# Patient Record
Sex: FEMALE | Race: ASIAN | NOT HISPANIC OR LATINO | Employment: FULL TIME | URBAN - METROPOLITAN AREA
[De-identification: names, ages, dates, MRNs, and addresses within clinical notes are randomized per-mention and may not be internally consistent; named-entity substitution may affect disease eponyms.]

---

## 2017-01-31 DIAGNOSIS — I10 ESSENTIAL HYPERTENSION, BENIGN: Primary | ICD-10-CM

## 2017-01-31 RX ORDER — LISINOPRIL 10 MG/1
30 TABLET ORAL DAILY
Qty: 270 TABLET | Refills: 0 | Status: SHIPPED | OUTPATIENT
Start: 2017-01-31 | End: 2017-04-24

## 2017-01-31 NOTE — TELEPHONE ENCOUNTER
LISINOPRIL      Last Written Prescription Date: 03-04-16  Last Fill Quantity: 270, # refills: 3    Last Office Visit with G, P or [facility] prescribing provider:  02-29-16   Future Office Visit:        BP Readings from Last 3 Encounters:   02/29/16 128/81   04/14/15 109/63   09/08/14 113/73

## 2017-02-28 DIAGNOSIS — F41.9 ANXIETY: ICD-10-CM

## 2017-02-28 RX ORDER — HYDROXYZINE HYDROCHLORIDE 25 MG/1
25 TABLET, FILM COATED ORAL EVERY 8 HOURS PRN
Qty: 60 TABLET | Refills: 3 | Status: SHIPPED | OUTPATIENT
Start: 2017-02-28 | End: 2017-10-24

## 2017-02-28 NOTE — TELEPHONE ENCOUNTER
Pt is calling and wanting this refilled today     hydrOXYzine (ATARAX) 25 MG tablet  Last Written Prescription Date: 6/30/16  Last Fill Quantity: 60, # refills: 3  Last Office Visit with G primary care provider:  2/29/16        Last PHQ-9 score on record= No flowsheet data found.      Neli Clark  Clinic [facility]

## 2017-03-24 DIAGNOSIS — E78.5 HYPERLIPIDEMIA LDL GOAL <130: ICD-10-CM

## 2017-03-24 RX ORDER — PRAVASTATIN SODIUM 10 MG
10 TABLET ORAL DAILY
Qty: 90 TABLET | Refills: 0 | Status: SHIPPED | OUTPATIENT
Start: 2017-03-24 | End: 2017-05-22

## 2017-03-24 NOTE — TELEPHONE ENCOUNTER
Pravastatin     Last Written Prescription Date: 02/29/2016  Last Fill Quantity: 90, # refills: 3  Last Office Visit with [facility] or [facility] prescribing provider: 02/29/2016       Lab Results   Component Value Date    CHOL 202 02/29/2016     Lab Results   Component Value Date    HDL 66 02/29/2016     Lab Results   Component Value Date    LDL 97 02/29/2016     Lab Results   Component Value Date    TRIG 196 02/29/2016     Lab Results   Component Value Date    CHOLHDLRATIO 3.0 06/12/2014     Coby Lao [facility]  Float Technician  [place]

## 2017-04-24 DIAGNOSIS — I10 ESSENTIAL HYPERTENSION, BENIGN: ICD-10-CM

## 2017-04-24 RX ORDER — LISINOPRIL 20 MG/1
30 TABLET ORAL DAILY
Qty: 45 TABLET | Refills: 0 | Status: SHIPPED | OUTPATIENT
Start: 2017-04-24 | End: 2017-05-22

## 2017-04-24 NOTE — TELEPHONE ENCOUNTER
Lisinopril     Last Written Prescription Date: 01/31/17  Last Fill Quantity: 270, # refills: 0  Last Office Visit with G, P or [facility] prescribing provider: 02/29/16       Potassium   Date Value Ref Range Status   02/29/2016 4.7 3.4 - 5.3 mmol/L Final     Creatinine   Date Value Ref Range Status   02/29/2016 0.76 0.52 - 1.04 mg/dL Final     BP Readings from Last 3 Encounters:   02/29/16 128/81   04/14/15 109/63   09/08/14 113/73

## 2017-05-22 ENCOUNTER — OFFICE VISIT (OUTPATIENT)
Dept: FAMILY MEDICINE | Facility: CLINIC | Age: 57
End: 2017-05-22
Payer: COMMERCIAL

## 2017-05-22 VITALS
DIASTOLIC BLOOD PRESSURE: 81 MMHG | HEART RATE: 86 BPM | HEIGHT: 58 IN | BODY MASS INDEX: 26.3 KG/M2 | SYSTOLIC BLOOD PRESSURE: 132 MMHG | TEMPERATURE: 97.9 F | WEIGHT: 125.3 LBS

## 2017-05-22 DIAGNOSIS — Z12.11 COLON CANCER SCREENING: ICD-10-CM

## 2017-05-22 DIAGNOSIS — Z12.31 ENCOUNTER FOR SCREENING MAMMOGRAM FOR BREAST CANCER: ICD-10-CM

## 2017-05-22 DIAGNOSIS — I10 ESSENTIAL HYPERTENSION, BENIGN: Primary | ICD-10-CM

## 2017-05-22 DIAGNOSIS — E78.5 HYPERLIPIDEMIA LDL GOAL <130: ICD-10-CM

## 2017-05-22 PROCEDURE — 99214 OFFICE O/P EST MOD 30 MIN: CPT | Performed by: NURSE PRACTITIONER

## 2017-05-22 RX ORDER — LISINOPRIL 20 MG/1
30 TABLET ORAL DAILY
Qty: 135 TABLET | Refills: 3 | Status: SHIPPED | OUTPATIENT
Start: 2017-05-22 | End: 2017-11-27

## 2017-05-22 RX ORDER — PRAVASTATIN SODIUM 10 MG
10 TABLET ORAL DAILY
Qty: 90 TABLET | Refills: 3 | Status: SHIPPED | OUTPATIENT
Start: 2017-05-22 | End: 2018-05-23

## 2017-05-22 NOTE — PROGRESS NOTES
"  SUBJECTIVE:                                                    Flor Seth is a 57 year old female who presents to clinic today for the following health issues:  Chief Complaint   Patient presents with     Hypertension     Pt here for a f/u on b/p and cholesterol meds.  Also due for fasting labs.       Hyperlipidemia Follow-Up      Rate your low fat/cholesterol diet?: good    Taking statin?  Yes, no muscle aches from statin    Other lipid medications/supplements?:  Fish oil/Omega 3,  without side effects     Hypertension Follow-up      Outpatient blood pressures are being checked at home.  Results are normal.    Low Salt Diet: low salt       Amount of exercise or physical activity: 1-2 days per wk, exercise video for 1 hr    Problems taking medications regularly: No    Medication side effects: none    Diet: low salt and low fat/cholesterol      Medication Followup of pravastatin and lisinopril    Taking Medication as prescribed: yes    Side Effects:  None    Medication Helping Symptoms:  yes       Problem list and histories reviewed & adjusted, as indicated.  Additional history: as documented      Reviewed and updated as needed this visit by clinical staff  Tobacco  Allergies  Med Hx  Surg Hx  Fam Hx  Soc Hx      Reviewed and updated as needed this visit by Provider         ROS:  Constitutional, HEENT, cardiovascular, pulmonary, gi and gu systems are negative, except as otherwise noted.    OBJECTIVE:                                                    /81  Pulse 86  Temp 97.9  F (36.6  C) (Tympanic)  Ht 4' 10.25\" (1.48 m)  Wt 125 lb 4.8 oz (56.8 kg)  BMI 25.96 kg/m2  Body mass index is 25.96 kg/(m^2).  GENERAL: healthy, alert and no distress  NECK: no adenopathy, no asymmetry, masses, or scars and thyroid normal to palpation  RESP: lungs clear to auscultation - no rales, rhonchi or wheezes  CV: regular rate and rhythm, normal S1 S2, no S3 or S4, no murmur, click or rub, no peripheral edema and " peripheral pulses strong  ABDOMEN: soft, nontender, no hepatosplenomegaly, no masses and bowel sounds normal  MS: no gross musculoskeletal defects noted, no edema       ASSESSMENT/PLAN:                                                        ICD-10-CM    1. Essential hypertension, benign I10 lisinopril (PRINIVIL/ZESTRIL) 20 MG tablet     Basic metabolic panel   2. Hyperlipidemia LDL goal <130 E78.5 pravastatin (PRAVACHOL) 10 MG tablet     Lipid panel reflex to direct LDL   3. Encounter for screening mammogram for breast cancer Z12.31 *MA Screening Digital Bilateral   4. Colon cancer screening Z12.11 Fecal colorectal cancer screen (FIT)       Patient Instructions   Schedule PAP with me. [phone]    Schedule mammogram: [phone]    Return FIT test.    Return for fasting labs.      The risks, benefits and treatment options of prescribed medications or other treatments have been discussed with the patient. The patient verbalized their understanding and should call or follow up if no improvement or if they develop further problems.    JUN Ngo [facility]

## 2017-05-22 NOTE — PATIENT INSTRUCTIONS
Schedule PAP with me. [phone]    Schedule mammogram: [phone]    Return FIT test.    Return for fasting labs.            Thank you for choosing [facility].  You may be receiving a survey in the mail from Moe Wilkins regarding your visit today.  Please take a few minutes to complete and return the survey to let us know how we are doing.      If you have questions or concerns, please contact us via [employer] or you can contact your care team at [phone].    Our Clinic hours are:  Monday 6:40 am  to 7:00 pm  Tuesday -Friday 6:40 am to 5:00 pm    The [place] outpatient lab hours are:  Monday - Friday 6:10 am to 4:45 pm  Saturdays 7:00 am to 11:00 am  Appointments are required, call [phone]    If you have clinical questions after hours or would like to schedule an appointment,  call the clinic at [phone].

## 2017-05-22 NOTE — NURSING NOTE
"Chief Complaint   Patient presents with     Hypertension     Pt here for a f/u on b/p and cholesterol meds.  Also due for fasting labs.       Initial /81  Pulse 86  Temp 97.9  F (36.6  C) (Tympanic)  Ht 4' 10.25\" (1.48 m)  Wt 125 lb 4.8 oz (56.8 kg)  BMI 25.96 kg/m2 Estimated body mass index is 25.96 kg/(m^2) as calculated from the following:    Height as of this encounter: 4' 10.25\" (1.48 m).    Weight as of this encounter: 125 lb 4.8 oz (56.8 kg).  Medication Reconciliation: complete  Radha Araujo CMA    "

## 2017-05-22 NOTE — MR AVS SNAPSHOT
After Visit Summary   5/22/2017    Flor Seth    MRN: [id number]           Patient Information     Date Of Birth          1960        Visit Information        Provider Department      5/22/2017 3:40 PM Xochilt Ro APRN CNP [facility]        Today's Diagnoses     Essential hypertension, benign    -  1    Hyperlipidemia LDL goal <130        Encounter for screening mammogram for breast cancer        Colon cancer screening          Care Instructions    Schedule PAP with me. [phone]    Schedule mammogram: [phone]    Return FIT test.    Return for fasting labs.            Thank you for choosing [facility].  You may be receiving a survey in the mail from [employer] regarding your visit today.  Please take a few minutes to complete and return the survey to let us know how we are doing.      If you have questions or concerns, please contact us via [employer] or you can contact your care team at [phone].    Our Clinic hours are:  Monday 6:40 am  to 7:00 pm  Tuesday -Friday 6:40 am to 5:00 pm    The [place] outpatient lab hours are:  Monday - Friday 6:10 am to 4:45 pm  Saturdays 7:00 am to 11:00 am  Appointments are required, call [phone]    If you have clinical questions after hours or would like to schedule an appointment,  call the clinic at [phone].          Follow-ups after your visit        Future tests that were ordered for you today     Open Future Orders        Priority Expected Expires Ordered    *MA Screening Digital Bilateral Routine  5/22/2018 5/22/2017    Fecal colorectal cancer screen (FIT) Routine 6/12/2017 8/14/2017 5/22/2017    Lipid panel reflex to direct LDL Routine  6/22/2017 5/22/2017    Basic metabolic panel Routine  6/22/2017 5/22/2017            Who to contact     If you have questions or need follow up information about today's clinic visit or your schedule please contact [facility] directly at  "[phone].  Normal or non-critical lab and imaging results will be communicated to you by MyChart, letter or phone within 4 business days after the clinic has received the results. If you do not hear from us within 7 days, please contact the clinic through [employer] or phone. If you have a critical or abnormal lab result, we will notify you by phone as soon as possible.  Submit refill requests through [employer] or call your pharmacy and they will forward the refill request to us. Please allow 3 business days for your refill to be completed.          Additional Information About Your Visit        [employer] Information     [employer] lets you send messages to your doctor, view your test results, renew your prescriptions, schedule appointments and more. To sign up, go to [email or web address] . Click on \"Log in\" on the left side of the screen, which will take you to the Welcome page. Then click on \"Sign up Now\" on the right side of the page.     You will be asked to enter the access code listed below, as well as some personal information. Please follow the directions to create your username and password.     Your access code is: [email or web address]  Expires: 2017  3:55 PM     Your access code will  in 90 days. If you need help or a new code, please call your [place] clinic or [phone].        Care EveryWhere ID     This is your Care EveryWhere ID. This could be used by other organizations to access your [place] medical records  [id number]        Your Vitals Were     Pulse Temperature Height BMI (Body Mass Index)          86 97.9  F (36.6  C) (Tympanic) 4' 10.25\" (1.48 m) 25.96 kg/m2         Blood Pressure from Last 3 Encounters:   17 132/81   16 128/81   04/14/15 109/63    Weight from Last 3 Encounters:   17 125 lb 4.8 oz (56.8 kg)   16 129 lb (58.5 kg)   04/14/15 118 lb 9.6 oz (53.8 kg)                 Today's Medication Changes          These changes are accurate as of: 17  3:55 " PM.  If you have any questions, ask your nurse or doctor.               These medicines have changed or have updated prescriptions.        Dose/Directions    lisinopril 20 MG tablet   Commonly known as:  PRINIVIL/ZESTRIL   This may have changed:  additional instructions   Used for:  Essential hypertension, benign   Changed by:  Xochilt Ro APRN CNP        Dose:  30 mg   Take 1.5 tablets (30 mg) by mouth daily   Quantity:  135 tablet   Refills:  3            Where to get your medicines      These medications were sent to [facility] [address]  [address] [address]     Phone:  [phone]     lisinopril 20 MG tablet    pravastatin 10 MG tablet                Primary Care Provider Office Phone # Fax #    JUN White [phone]       [facility] [address] [address]        Thank you!     Thank you for choosing [facility]  for your care. Our goal is always to provide you with excellent care. Hearing back from our patients is one way we can continue to improve our services. Please take a few minutes to complete the written survey that you may receive in the mail after your visit with us. Thank you!             Your Updated Medication List - Protect others around you: Learn how to safely use, store and throw away your medicines at www.disposemymeds.org.          This list is accurate as of: 5/22/17  3:55 PM.  Always use your most recent med list.                   Brand Name Dispense Instructions for use    aspirin 81 MG tablet      1 TABLET BY MOUTH DAILY       hydrOXYzine 25 MG tablet    ATARAX    60 tablet    Take 1 tablet (25 mg) by mouth every 8 hours as needed for anxiety       lisinopril 20 MG tablet    PRINIVIL/ZESTRIL    135 tablet    Take 1.5 tablets (30 mg) by mouth daily       Multi-vitamin Tabs tablet   Generic drug:  multivitamin, therapeutic with  minerals      Take 1 tablet by mouth daily.       OMEGA 3 PO      Once daily       pravastatin 10 MG tablet    PRAVACHOL    90 tablet    Take 1 tablet (10 mg) by mouth daily Due for labs

## 2017-08-12 ENCOUNTER — HEALTH MAINTENANCE LETTER (OUTPATIENT)
Age: 57
End: 2017-08-12

## 2017-10-24 DIAGNOSIS — F41.9 ANXIETY: ICD-10-CM

## 2017-10-25 NOTE — TELEPHONE ENCOUNTER
hydrOXYzine (ATARAX) 25 MG tablet      Last Written Prescription Date: 2/28/17  Last Fill Quantity: 60,  # refills: 3

## 2017-10-26 NOTE — TELEPHONE ENCOUNTER
Please advise on refill as this medication is not on protocol for anxiety use.    hydrOXYzine 25 mg  Last written on: 2/28/17  Last Fill: #60 with 3 refills    Last appointment in clinic:  5/22/17    Vashti ALBERTS RN

## 2017-10-26 NOTE — TELEPHONE ENCOUNTER
Patient is calling and asking why her Rx has not been filled, and can that be expedited today.  Please call and when it is sent to her pharmacy.  Janel Haile  Clinic [facility]  Flex  Patient will be out of this medication tomorrow.

## 2017-10-27 RX ORDER — HYDROXYZINE HYDROCHLORIDE 25 MG/1
TABLET, FILM COATED ORAL
Qty: 60 TABLET | Refills: 3 | Status: SHIPPED | OUTPATIENT
Start: 2017-10-27 | End: 2018-11-02

## 2017-11-27 ENCOUNTER — OFFICE VISIT (OUTPATIENT)
Dept: FAMILY MEDICINE | Facility: CLINIC | Age: 57
End: 2017-11-27
Payer: COMMERCIAL

## 2017-11-27 VITALS
SYSTOLIC BLOOD PRESSURE: 135 MMHG | HEART RATE: 67 BPM | BODY MASS INDEX: 26.41 KG/M2 | WEIGHT: 131 LBS | HEIGHT: 59 IN | DIASTOLIC BLOOD PRESSURE: 82 MMHG | TEMPERATURE: 97.2 F

## 2017-11-27 DIAGNOSIS — Z23 NEED FOR PROPHYLACTIC VACCINATION AND INOCULATION AGAINST INFLUENZA: ICD-10-CM

## 2017-11-27 DIAGNOSIS — Z12.4 CERVICAL CANCER SCREENING: ICD-10-CM

## 2017-11-27 DIAGNOSIS — I10 BENIGN ESSENTIAL HYPERTENSION: Primary | ICD-10-CM

## 2017-11-27 PROCEDURE — 87624 HPV HI-RISK TYP POOLED RSLT: CPT | Performed by: NURSE PRACTITIONER

## 2017-11-27 PROCEDURE — 90686 IIV4 VACC NO PRSV 0.5 ML IM: CPT | Performed by: NURSE PRACTITIONER

## 2017-11-27 PROCEDURE — 88175 CYTOPATH C/V AUTO FLUID REDO: CPT | Performed by: NURSE PRACTITIONER

## 2017-11-27 PROCEDURE — 99213 OFFICE O/P EST LOW 20 MIN: CPT | Mod: 25 | Performed by: NURSE PRACTITIONER

## 2017-11-27 PROCEDURE — 90471 IMMUNIZATION ADMIN: CPT | Performed by: NURSE PRACTITIONER

## 2017-11-27 RX ORDER — LISINOPRIL 40 MG/1
40 TABLET ORAL DAILY
Qty: 90 TABLET | Refills: 3 | Status: SHIPPED | OUTPATIENT
Start: 2017-11-27 | End: 2018-10-24

## 2017-11-27 NOTE — MR AVS SNAPSHOT
After Visit Summary   11/27/2017    Flor Seth    MRN: [id number]           Patient Information     Date Of Birth          1960        Visit Information        Provider Department      11/27/2017 8:00 AM Xochilt Ro APRN CNP [facility]        Today's Diagnoses     Benign essential hypertension    -  1    Cervical cancer screening        Need for prophylactic vaccination and inoculation against influenza          Care Instructions      Schedule mammogram [phone]      Thank you for choosing [facility].  You may be receiving a survey in the mail from Moe Wilkins regarding your visit today.  Please take a few minutes to complete and return the survey to let us know how we are doing.      If you have questions or concerns, please contact us via [employer] or you can contact your care team at [phone].    Our Clinic hours are:  Monday 6:40 am  to 7:00 pm  Tuesday -Friday 6:40 am to 5:00 pm    The [place] outpatient lab hours are:  Monday - Friday 6:10 am to 4:45 pm  Saturdays 7:00 am to 11:00 am  Appointments are required, call [phone]    If you have clinical questions after hours or would like to schedule an appointment,  call the clinic at [phone].            Follow-ups after your visit        Who to contact     If you have questions or need follow up information about today's clinic visit or your schedule please contact [facility] directly at [phone].  Normal or non-critical lab and imaging results will be communicated to you by MyChart, letter or phone within 4 business days after the clinic has received the results. If you do not hear from us within 7 days, please contact the clinic through [employer] or phone. If you have a critical or abnormal lab result, we will notify you by phone as soon as possible.  Submit refill requests through [employer] or call your pharmacy and they will forward the refill request to us. Please allow  "3 business days for your refill to be completed.          Additional Information About Your Visit        MyChart Information     [employer] lets you send messages to your doctor, view your test results, renew your prescriptions, schedule appointments and more. To sign up, go to [email or web address] . Click on \"Log in\" on the left side of the screen, which will take you to the Welcome page. Then click on \"Sign up Now\" on the right side of the page.     You will be asked to enter the access code listed below, as well as some personal information. Please follow the directions to create your username and password.     Your access code is: [email or web address]  Expires: 2018  8:32 AM     Your access code will  in 90 days. If you need help or a new code, please call your [place] clinic or [phone].        Care EveryWhere ID     This is your Care EveryWhere ID. This could be used by other organizations to access your [place] medical records  [id number]        Your Vitals Were     Pulse Temperature Height BMI (Body Mass Index)          67 97.2  F (36.2  C) (Oral) 4' 10.75\" (1.492 m) 26.68 kg/m2         Blood Pressure from Last 3 Encounters:   17 135/82   17 132/81   16 128/81    Weight from Last 3 Encounters:   17 131 lb (59.4 kg)   17 125 lb 4.8 oz (56.8 kg)   16 129 lb (58.5 kg)              We Performed the Following     FLU VAC, SPLIT VIRUS IM > 3 YO (QUADRIVALENT) [64612]     HPV High Risk Types DNA Cervical     Pap imaged thin layer diagnostic with HPV (select HPV order below)     Vaccine Administration, Initial [15195]          Today's Medication Changes          These changes are accurate as of: 17  8:32 AM.  If you have any questions, ask your nurse or doctor.               These medicines have changed or have updated prescriptions.        Dose/Directions    lisinopril 40 MG tablet   Commonly known as:  PRINIVIL/ZESTRIL   This may have changed:    - medication " strength  - how much to take   Used for:  Benign essential hypertension   Changed by:  Xochilt Ro APRN CNP        Dose:  40 mg   Take 1 tablet (40 mg) by mouth daily   Quantity:  90 tablet   Refills:  3            Where to get your medicines      These medications were sent [place] [facility] [place] - [address]  [address] [address]     Phone:  [phone]     lisinopril 40 MG tablet                Primary Care Provider Office Phone # Fax #    JUN White [phone]       [address] [address]        Equal Access to Services     TANNER SILVA : Hadii joni hatch hadasho Soomaali, waaxda luqadaha, qaybta kaalmada adeegyada, shakira vitale . So [facility] [phone].    ATENCIÓN: Si habla español, tiene a traivs disposición servicios gratuitos de asistencia lingüística. Llame al [phone].    We comply with applicable federal civil rights laws and [place] laws. We do not discriminate on the basis of race, color, national origin, age, disability, sex, sexual orientation, or gender identity.            Thank you!     Thank you for choosing [facility]  for your care. Our goal is always to provide you with excellent care. Hearing back from our patients is one way we can continue to improve our services. Please take a few minutes to complete the written survey that you may receive in the mail after your visit with us. Thank you!             Your Updated Medication List - Protect others around you: Learn how to safely use, store and throw away your medicines at www.disposemymeds.org.          This list is accurate as of: 11/27/17  8:32 AM.  Always use your most recent med list.                   Brand Name Dispense Instructions for use Diagnosis    aspirin 81 MG tablet      1 TABLET BY MOUTH DAILY        hydrOXYzine 25 MG tablet    ATARAX    60 tablet    TAKE ONE TABLET BY  MOUTH EVERY 8 HOURS AS NEEDED FOR ANXIETY    Anxiety       lisinopril 40 MG tablet    PRINIVIL/ZESTRIL    90 tablet    Take 1 tablet (40 mg) by mouth daily    Benign essential hypertension       Multi-vitamin Tabs tablet   Generic drug:  multivitamin, therapeutic with minerals      Take 1 tablet by mouth daily.        OMEGA 3 PO      Once daily        pravastatin 10 MG tablet    PRAVACHOL    90 tablet    Take 1 tablet (10 mg) by mouth daily Due for labs    Hyperlipidemia LDL goal <130

## 2017-11-27 NOTE — PATIENT INSTRUCTIONS
Schedule mammogram [phone]      Thank you for choosing [facility].  You may be receiving a survey in the mail from [employer] regarding your visit today.  Please take a few minutes to complete and return the survey to let us know how we are doing.      If you have questions or concerns, please contact us via [employer] or you can contact your care team at [phone].    Our Clinic hours are:  Monday 6:40 am  to 7:00 pm  Tuesday -Friday 6:40 am to 5:00 pm    The [place] outpatient lab hours are:  Monday - Friday 6:10 am to 4:45 pm  Saturdays 7:00 am to 11:00 am  Appointments are required, call [phone]    If you have clinical questions after hours or would like to schedule an appointment,  call the clinic at [phone].

## 2017-11-27 NOTE — NURSING NOTE
"Chief Complaint   Patient presents with     Hypertension     concerns about blood pressure running high.     Health Maintenance     over due for pap smear- will do today if time., mammogram and colon cancer screenings.  future orders already in place for mammogram and FIT     Flu Shot       Initial /82  Pulse 67  Temp 97.2  F (36.2  C) (Oral)  Ht 4' 10.75\" (1.492 m)  Wt 131 lb (59.4 kg)  BMI 26.68 kg/m2 Estimated body mass index is 26.68 kg/(m^2) as calculated from the following:    Height as of this encounter: 4' 10.75\" (1.492 m).    Weight as of this encounter: 131 lb (59.4 kg).  Medication Reconciliation: complete  "

## 2017-11-27 NOTE — PROGRESS NOTES

## 2017-11-27 NOTE — PROGRESS NOTES
"  SUBJECTIVE:   Flor Seth is a 57 year old female who presents to clinic today for the following health issues:      Chief Complaint   Patient presents with     Hypertension     concerns about blood pressure running high.     Health Maintenance     over due for pap smear- will do today if time., mammogram and colon cancer screenings.  future orders already in place for mammogram and FIT     Flu Shot         Hypertension Follow-up      Outpatient blood pressures are being checked at home.  states blood pressure is fine at home, but at work it goes up- she states she can feel it go up.  When she gets home from work will check bp - 150-160 systolic.  Also feels her heart rate up    Low Salt Diet: not monitoring salt- admits to adding a little bit        Amount of exercise or physical activity: walking on weekends    Problems taking medications regularly: No    Medication side effects: none    Diet: regular (no restrictions)          Problem list and histories reviewed & adjusted, as indicated.  Additional history: as documented      Reviewed and updated as needed this visit by clinical staff  Allergies  Meds       Reviewed and updated as needed this visit by Provider         ROS:  Constitutional, HEENT, cardiovascular, pulmonary, gi and gu systems are negative, except as otherwise noted.      OBJECTIVE:   /82  Pulse 67  Temp 97.2  F (36.2  C) (Oral)  Ht 4' 10.75\" (1.492 m)  Wt 131 lb (59.4 kg)  BMI 26.68 kg/m2  Body mass index is 26.68 kg/(m^2).  GENERAL: healthy, alert and no distress  RESP: lungs clear to auscultation - no rales, rhonchi or wheezes  CV: regular rate and rhythm, normal S1 S2, no S3 or S4, no murmur, click or rub, no peripheral edema and peripheral pulses strong   (female): normal female external genitalia, normal urethral meatus, vaginal mucosa, normal cervix/adnexa/uterus without masses or discharge      ASSESSMENT/PLAN:       ICD-10-CM    1. Benign essential hypertension I10 " Borderline control.  Increase lisinopril (PRINIVIL/ZESTRIL) to 40 MG tablet daily   2. Cervical cancer screening Z12.4 Pap imaged thin layer diagnostic with HPV (select HPV order below)     HPV High Risk Types DNA Cervical   3. Need for prophylactic vaccination and inoculation against influenza Z23 FLU VAC, SPLIT VIRUS IM > 3 YO (QUADRIVALENT) [83379]     Vaccine Administration, Initial [53103]       Patient Instructions     Schedule mammogram [phone]        The risks, benefits and treatment options of prescribed medications or other treatments have been discussed with the patient. The patient verbalized their understanding and should call or follow up if no improvement or if they develop further problems.    JUN Ngo [facility]

## 2017-11-27 NOTE — LETTER
December 3, 2017    Flor Seth  [address] [facility] [place] [address]    Dear Flor,  We are happy to inform you that your PAP smear result from 11/27/17 is normal.  We are now able to do a follow up test on PAP smears. The DNA test is for HPV (Human Papilloma Virus). Cervical cancer is closely linked with certain types of HPV. Your result showed no evidence of high risk HPV.  Therefore we recommend you return in 3 years for your next pap smear and HPV test.  You will still need to return to the clinic every year for an annual exam and other preventive tests.  Please contact the clinic at [phone] with any questions.  Sincerely,    JUN Ngo CNP/rlm

## 2017-11-29 LAB
COPATH REPORT: NORMAL
PAP: NORMAL

## 2017-12-01 LAB
FINAL DIAGNOSIS: NORMAL
HPV HR 12 DNA CVX QL NAA+PROBE: NEGATIVE
HPV16 DNA SPEC QL NAA+PROBE: NEGATIVE
HPV18 DNA SPEC QL NAA+PROBE: NEGATIVE
SPECIMEN DESCRIPTION: NORMAL

## 2018-05-23 DIAGNOSIS — E78.5 HYPERLIPIDEMIA LDL GOAL <130: ICD-10-CM

## 2018-05-23 NOTE — TELEPHONE ENCOUNTER
"Requested Prescriptions   Pending Prescriptions Disp Refills     pravastatin (PRAVACHOL) 10 MG tablet [Pharmacy Med Name: PRAVASTATIN SODIUM 10MG TABS]  Last Written Prescription Date:  05/22/17  Last Fill Quantity: 90,  # refills: 3   Last office visit: 11/27/2017 with prescribing provider:  11/27/17   Future Office Visit:     90 tablet 3     Sig: TAKE ONE TABLET BY MOUTH EVERY DAY    Statins Protocol Failed    5/23/2018 10:53 AM       Failed - LDL on file in past 12 months    Recent Labs   Lab Test  02/29/16   0843   LDL  97          Passed - No abnormal creatine kinase in past 12 months    No lab results found.        Passed - Recent (12 mo) or future (30 days) visit within the authorizing provider's specialty    Patient had office visit in the last 12 months or has a visit in the next 30 days with authorizing provider or within the authorizing provider's specialty.  See \"Patient Info\" tab in inbasket, or \"Choose Columns\" in Meds & Orders section of the refill encounter.           Passed - Patient is age 18 or older       Passed - No active pregnancy on record       Passed - No positive pregnancy test in past 12 months          "

## 2018-05-24 RX ORDER — PRAVASTATIN SODIUM 10 MG
10 TABLET ORAL DAILY
Qty: 30 TABLET | Refills: 0 | Status: SHIPPED | OUTPATIENT
Start: 2018-05-24 | End: 2018-06-26

## 2018-06-06 ENCOUNTER — TELEPHONE (OUTPATIENT)
Dept: FAMILY MEDICINE | Facility: CLINIC | Age: 58
End: 2018-06-06

## 2018-06-06 DIAGNOSIS — E78.5 HYPERLIPIDEMIA LDL GOAL <130: ICD-10-CM

## 2018-06-06 DIAGNOSIS — I10 ESSENTIAL HYPERTENSION, BENIGN: ICD-10-CM

## 2018-06-06 LAB
ANION GAP SERPL CALCULATED.3IONS-SCNC: 6 MMOL/L (ref 3–14)
BUN SERPL-MCNC: 19 MG/DL (ref 7–30)
CALCIUM SERPL-MCNC: 8.7 MG/DL (ref 8.5–10.1)
CHLORIDE SERPL-SCNC: 108 MMOL/L (ref 94–109)
CHOLEST SERPL-MCNC: 202 MG/DL
CO2 SERPL-SCNC: 27 MMOL/L (ref 20–32)
CREAT SERPL-MCNC: 0.7 MG/DL (ref 0.52–1.04)
GFR SERPL CREATININE-BSD FRML MDRD: 87 ML/MIN/1.7M2
GLUCOSE SERPL-MCNC: 100 MG/DL (ref 70–99)
HDLC SERPL-MCNC: 69 MG/DL
LDLC SERPL CALC-MCNC: 120 MG/DL
NONHDLC SERPL-MCNC: 133 MG/DL
POTASSIUM SERPL-SCNC: 4.2 MMOL/L (ref 3.4–5.3)
SODIUM SERPL-SCNC: 141 MMOL/L (ref 133–144)
TRIGL SERPL-MCNC: 66 MG/DL

## 2018-06-06 PROCEDURE — 80061 LIPID PANEL: CPT | Performed by: NURSE PRACTITIONER

## 2018-06-06 PROCEDURE — 36415 COLL VENOUS BLD VENIPUNCTURE: CPT | Performed by: NURSE PRACTITIONER

## 2018-06-06 PROCEDURE — 80048 BASIC METABOLIC PNL TOTAL CA: CPT | Performed by: NURSE PRACTITIONER

## 2018-06-06 NOTE — LETTER
[facility]  [address] [facility] [place] [address] [phone]    June 6, 2018    Flor Seth  [address] [facility] [facility] [address]          Dear Flor,    --Mammogram screening is generally recommended every year starting at age of 50.  Some women may choose to be screened at an earlier age ( between 40 & 50) depending on risk factors and discussions with her primary provider.   Please call [phone] to schedule a mammogram  --Colon cancer screening is generally recommended in all patients over the age of 50 years of age. This can be with either colonoscopy every 10 years, or testing the stool for blood one time per year (called a FIT test, a simple card you can send back to us in the mail). On review of our electronic medical record it appears you are due for colon cancer screening. Please call the clinic to assist in setting up a colonoscopy or, if you prefer, setting up the test for stool blood(FIT).    If you have had either of these tests at an outside facility, please let us know us so that we can update your record.     Please disregard this notice if you have already scheduled an appointment.     Sincerely,    Xochilt TANNER  [facility] - [phone]  [email or web address]

## 2018-06-14 ENCOUNTER — TELEPHONE (OUTPATIENT)
Dept: FAMILY MEDICINE | Facility: CLINIC | Age: 58
End: 2018-06-14

## 2018-06-14 NOTE — TELEPHONE ENCOUNTER
6/14/2018    Attempt 1    Contacted patient in regards to scheduling VIP mammogram  Message on voicemail     Patient is also due for - Preventive Health Screening Colonoscopy/fit test    Comments: \      Outreach   Onelia CROCKETT

## 2018-06-19 NOTE — TELEPHONE ENCOUNTER
6/19/2018    Attempt 2    Contacted patient in regards to scheduling VIP mammogram  Message on voicemail     Patient is also due for - Preventive Health Screening Colonoscopy    Comments:       Outreach   SB

## 2018-06-22 NOTE — TELEPHONE ENCOUNTER
6/22/2018    Attempt 3    Contacted patient in regards to scheduling VIP mammogram  Message on voicemail     Patient is also due for - Preventive Health Screening Colonoscopy    Comments:       Outreach   SB

## 2018-06-26 DIAGNOSIS — E78.5 HYPERLIPIDEMIA LDL GOAL <130: ICD-10-CM

## 2018-06-27 NOTE — TELEPHONE ENCOUNTER
"Requested Prescriptions   Pending Prescriptions Disp Refills     pravastatin (PRAVACHOL) 10 MG tablet [Pharmacy Med Name: PRAVASTATIN SODIUM 10MG TABS]  Last Written Prescription Date:  05/24/2018  Last Fill Quantity: 30,  # refills: 0   Last office visit: 11/27/2017 with prescribing provider:  maximiliano   Future Office Visit:     30 tablet 0     Sig: TAKE ONE TABLET BY MOUTH EVERY DAY (NEEDS FASTING LAB WORK)    Statins Protocol Passed    6/26/2018  3:51 AM       Passed - LDL on file in past 12 months    Recent Labs   Lab Test  06/06/18   0718   LDL  120*            Passed - No abnormal creatine kinase in past 12 months    No lab results found.            Passed - Recent (12 mo) or future (30 days) visit within the authorizing provider's specialty    Patient had office visit in the last 12 months or has a visit in the next 30 days with authorizing provider or within the authorizing provider's specialty.  See \"Patient Info\" tab in inbasket, or \"Choose Columns\" in Meds & Orders section of the refill encounter.           Passed - Patient is age 18 or older       Passed - No active pregnancy on record       Passed - No positive pregnancy test in past 12 months        Francisco J Austin RT (R)    "

## 2018-06-28 RX ORDER — PRAVASTATIN SODIUM 10 MG
10 TABLET ORAL DAILY
Qty: 30 TABLET | Refills: 4 | Status: SHIPPED | OUTPATIENT
Start: 2018-06-28 | End: 2018-11-02

## 2018-06-28 NOTE — TELEPHONE ENCOUNTER
Prescription approved per [facility] Refill Protocol.  Carla KAUFMAN RN

## 2018-08-19 ENCOUNTER — HEALTH MAINTENANCE LETTER (OUTPATIENT)
Age: 58
End: 2018-08-19

## 2018-10-24 ENCOUNTER — TELEPHONE (OUTPATIENT)
Dept: FAMILY MEDICINE | Facility: CLINIC | Age: 58
End: 2018-10-24

## 2018-10-24 DIAGNOSIS — I10 BENIGN ESSENTIAL HYPERTENSION: ICD-10-CM

## 2018-10-24 RX ORDER — LISINOPRIL 40 MG/1
TABLET ORAL
Qty: 30 TABLET | Refills: 0 | Status: SHIPPED | OUTPATIENT
Start: 2018-10-24 | End: 2018-11-02

## 2018-10-24 NOTE — TELEPHONE ENCOUNTER
Left message for patient to call back at [phone].    Due for OV in November.     30 day supply provided.  Gayatri LINARES RN

## 2018-10-24 NOTE — TELEPHONE ENCOUNTER
"Requested Prescriptions   Pending Prescriptions Disp Refills     lisinopril (PRINIVIL/ZESTRIL) 40 MG tablet [Pharmacy Med Name: LISINOPRIL 40MG TABS]  Last Written Prescription Date:  11/27/17  Last Fill Quantity: 90,  # refills: 3   Last office visit: 11/27/2017 with prescribing provider:  Jia   Future Office Visit:     90 tablet 3     Sig: TAKE ONE TABLET BY MOUTH EVERY DAY    ACE Inhibitors (Including Combos) Protocol Passed    10/24/2018 11:25 AM       Passed - Blood pressure under 140/90 in past 12 months    BP Readings from Last 3 Encounters:   11/27/17 135/82   05/22/17 132/81   02/29/16 128/81                Passed - Recent (12 mo) or future (30 days) visit within the authorizing provider's specialty    Patient had office visit in the last 12 months or has a visit in the next 30 days with authorizing provider or within the authorizing provider's specialty.  See \"Patient Info\" tab in inbasket, or \"Choose Columns\" in Meds & Orders section of the refill encounter.             Passed - Patient is age 18 or older       Passed - No active pregnancy on record       Passed - Normal serum creatinine on file in past 12 months    Recent Labs   Lab Test  06/06/18 0718   CR  0.70            Passed - Normal serum potassium on file in past 12 months    Recent Labs   Lab Test  06/06/18 0718   POTASSIUM  4.2            Passed - No positive pregnancy test in past 12 months          "

## 2018-11-02 ENCOUNTER — OFFICE VISIT (OUTPATIENT)
Dept: FAMILY MEDICINE | Facility: CLINIC | Age: 58
End: 2018-11-02
Payer: COMMERCIAL

## 2018-11-02 ENCOUNTER — HOSPITAL ENCOUNTER (OUTPATIENT)
Dept: MAMMOGRAPHY | Facility: CLINIC | Age: 58
Discharge: HOME | End: 2018-11-02
Attending: NURSE PRACTITIONER | Admitting: NURSE PRACTITIONER
Payer: COMMERCIAL

## 2018-11-02 VITALS
TEMPERATURE: 97.1 F | DIASTOLIC BLOOD PRESSURE: 76 MMHG | BODY MASS INDEX: 26.41 KG/M2 | HEART RATE: 52 BPM | SYSTOLIC BLOOD PRESSURE: 112 MMHG | HEIGHT: 59 IN | WEIGHT: 131 LBS | RESPIRATION RATE: 12 BRPM

## 2018-11-02 DIAGNOSIS — F41.9 ANXIETY: ICD-10-CM

## 2018-11-02 DIAGNOSIS — Z12.11 SPECIAL SCREENING FOR MALIGNANT NEOPLASMS, COLON: ICD-10-CM

## 2018-11-02 DIAGNOSIS — Z23 NEED FOR PROPHYLACTIC VACCINATION AND INOCULATION AGAINST INFLUENZA: ICD-10-CM

## 2018-11-02 DIAGNOSIS — Z12.31 ENCOUNTER FOR SCREENING MAMMOGRAM FOR BREAST CANCER: ICD-10-CM

## 2018-11-02 DIAGNOSIS — I10 BENIGN ESSENTIAL HYPERTENSION: Primary | ICD-10-CM

## 2018-11-02 DIAGNOSIS — E78.5 HYPERLIPIDEMIA LDL GOAL <130: ICD-10-CM

## 2018-11-02 PROCEDURE — 90682 RIV4 VACC RECOMBINANT DNA IM: CPT | Performed by: NURSE PRACTITIONER

## 2018-11-02 PROCEDURE — 99214 OFFICE O/P EST MOD 30 MIN: CPT | Mod: 25 | Performed by: NURSE PRACTITIONER

## 2018-11-02 PROCEDURE — 90471 IMMUNIZATION ADMIN: CPT | Performed by: NURSE PRACTITIONER

## 2018-11-02 PROCEDURE — 77067 SCR MAMMO BI INCL CAD: CPT

## 2018-11-02 RX ORDER — LISINOPRIL 40 MG/1
40 TABLET ORAL DAILY
Qty: 90 TABLET | Refills: 3 | Status: SHIPPED | OUTPATIENT
Start: 2018-11-02 | End: 2019-10-02

## 2018-11-02 RX ORDER — PRAVASTATIN SODIUM 10 MG
10 TABLET ORAL DAILY
Qty: 90 TABLET | Refills: 3 | Status: SHIPPED | OUTPATIENT
Start: 2018-11-02 | End: 2019-10-24

## 2018-11-02 RX ORDER — HYDROXYZINE HYDROCHLORIDE 25 MG/1
TABLET, FILM COATED ORAL
Qty: 60 TABLET | Refills: 3 | Status: SHIPPED | OUTPATIENT
Start: 2018-11-02 | End: 2019-07-23

## 2018-11-02 NOTE — MR AVS SNAPSHOT
After Visit Summary   11/2/2018    Flor Seth    MRN: [id number]           Patient Information     Date Of Birth          1960        Visit Information        Provider Department      11/2/2018 7:40 AM Xochilt Ro APRN CNP [facility]        Today's Diagnoses     Benign essential hypertension    -  1    Hyperlipidemia LDL goal <130        Anxiety        Need for prophylactic vaccination and inoculation against influenza        Special screening for malignant neoplasms, colon        Encounter for screening mammogram for breast cancer          Care Instructions    Return FIT test        Thank you for choosing [facility].  You may be receiving a survey in the mail from [employer] regarding your visit today.  Please take a few minutes to complete and return the survey to let us know how we are doing.      If you have questions or concerns, please contact us via IntellinX or you can contact your care team at [phone].    Our Clinic hours are:  Monday 6:40 am  to 7:00 pm  Tuesday -Friday 6:40 am to 5:00 pm    The [place] outpatient lab hours are:  Monday - Friday 6:10 am to 4:45 pm  Saturdays 7:00 am to 11:00 am  Appointments are required, call [phone]    If you have clinical questions after hours or would like to schedule an appointment,  call the clinic at [phone].            Follow-ups after your visit        Future tests that were ordered for you today     Open Future Orders        Priority Expected Expires Ordered    *MA Screening Digital Bilateral Routine  11/2/2019 11/2/2018    Fecal colorectal cancer screen FIT Routine 11/23/2018 1/25/2019 11/2/2018            Who to contact     If you have questions or need follow up information about today's clinic visit or your schedule please contact [facility] directly at [phone].  Normal or non-critical lab and imaging results will be communicated to you by PlayyOnhart, letter or phone  "within 4 business days after the clinic has received the results. If you do not hear from us within 7 days, please contact the clinic through Lokofotot or phone. If you have a critical or abnormal lab result, we will notify you by phone as soon as possible.  Submit refill requests through EpiEP or call your pharmacy and they will forward the refill request to us. Please allow 3 business days for your refill to be completed.          Additional Information About Your Visit        Care EveryWhere ID     This is your Care EveryWhere ID. This could be used by other organizations to access your [place] medical records  [id number]        Your Vitals Were     Pulse Temperature Respirations Height BMI (Body Mass Index)       52 97.1  F (36.2  C) (Tympanic) 12 4' 10.5\" (1.486 m) 26.91 kg/m2        Blood Pressure from Last 3 Encounters:   11/02/18 112/76   11/27/17 135/82   05/22/17 132/81    Weight from Last 3 Encounters:   11/02/18 131 lb (59.4 kg)   11/27/17 131 lb (59.4 kg)   05/22/17 125 lb 4.8 oz (56.8 kg)              We Performed the Following     FLU VACCINE, (RIV4) RECOMBINANT HA  , IM (FluBlok, egg free) [37351]- >18 YRS ([facility] recommended  50-64 YRS)     Vaccine Administration, Initial [19103]          Today's Medication Changes          These changes are accurate as of 11/2/18  8:10 AM.  If you have any questions, ask your nurse or doctor.               These medicines have changed or have updated prescriptions.        Dose/Directions    lisinopril 40 MG tablet   Commonly known as:  PRINIVIL/ZESTRIL   This may have changed:  See the new instructions.   Used for:  Benign essential hypertension   Changed by:  Xochilt Ro APRN CNP        Dose:  40 mg   Take 1 tablet (40 mg) by mouth daily   Quantity:  90 tablet   Refills:  3            Where to get your medicines      These medications were sent to [facility] [place] - [address]  [address] [facility] [facility] [address] "     Phone:  [phone]     hydrOXYzine 25 MG tablet    lisinopril 40 MG tablet    pravastatin 10 MG tablet                Primary Care Provider Office Phone # Fax #    Xochilt Ro, JUN SULLIVAN [phone] [address] [address]        Equal Access to Services     TANNER SILVA : Hadii aad ku hadasho Soomaali, waaxda luqadaha, qaybta kaalmada adeegyada, waxay idiin hayaan adeeg kharash la'aan . So [facility] [phone].    ATENCIÓN: Si habla español, tiene a travis disposición servicios gratuitos de asistencia lingüística. Alexis al [phone].    We comply with applicable federal civil rights laws and [place] laws. We do not discriminate on the basis of race, color, national origin, age, disability, sex, sexual orientation, or gender identity.            Thank you!     Thank you for choosing [facility]  for your care. Our goal is always to provide you with excellent care. Hearing back from our patients is one way we can continue to improve our services. Please take a few minutes to complete the written survey that you may receive in the mail after your visit with us. Thank you!             Your Updated Medication List - Protect others around you: Learn how to safely use, store and throw away your medicines at www.disposemymeds.org.          This list is accurate as of 11/2/18  8:10 AM.  Always use your most recent med list.                   Brand Name Dispense Instructions for use Diagnosis    aspirin 81 MG tablet      1 TABLET BY MOUTH DAILY        hydrOXYzine 25 MG tablet    ATARAX    60 tablet    TAKE ONE TABLET BY MOUTH EVERY 8 HOURS AS NEEDED FOR ANXIETY    Anxiety       lisinopril 40 MG tablet    PRINIVIL/ZESTRIL    90 tablet    Take 1 tablet (40 mg) by mouth daily    Benign essential hypertension       Multi-vitamin Tabs tablet   Generic drug:  multivitamin, therapeutic with minerals      Take 1 tablet by mouth daily.        OMEGA 3 PO      Once daily         pravastatin 10 MG tablet    PRAVACHOL    90 tablet    Take 1 tablet (10 mg) by mouth daily    Hyperlipidemia LDL goal <130

## 2018-11-02 NOTE — PROGRESS NOTES
"  SUBJECTIVE:   Flor Seth is a 58 year old female who presents to clinic today for the following health issues:      Hyperlipidemia Follow-Up      Rate your low fat/cholesterol diet?: fair    Taking statin?  Yes, no muscle aches from statin    Other lipid medications/supplements?:  Fish oil/Omega 3,  without side effects    Hypertension Follow-up      Outpatient blood pressures are being checked at home.- twice a day  Results are 130's/ 80's.    Low Salt Diet: t- sometimes adds salt    Anxiety Follow-Up    Status since last visit: stable    Other associated symptoms:None    Complicating factors:   Significant life event: No   Current substance abuse: None  Depression symptoms: No  No flowsheet data found.      Amount of exercise or physical activity: 2-3 days/week for an average of 30-45 minutes    Problems taking medications regularly: No    Medication side effects: none          Problem list and histories reviewed & adjusted, as indicated.  Additional history: as documented      Reviewed and updated as needed this visit by clinical staff  Tobacco  Allergies  Med Hx  Surg Hx  Fam Hx  Soc Hx      Reviewed and updated as needed this visit by Provider         ROS:  Constitutional, HEENT, cardiovascular, pulmonary, gi and gu systems are negative, except as otherwise noted.    OBJECTIVE:     /76 (BP Location: Right arm)  Pulse 52  Temp 97.1  F (36.2  C) (Tympanic)  Resp 12  Ht 4' 10.5\" (1.486 m)  Wt 131 lb (59.4 kg)  BMI 26.91 kg/m2  Body mass index is 26.91 kg/(m^2).  GENERAL: healthy, alert and no distress  NECK: no adenopathy, no asymmetry, masses, or scars and thyroid normal to palpation  RESP: lungs clear to auscultation - no rales, rhonchi or wheezes  CV: regular rate and rhythm, normal S1 S2, no S3 or S4, no murmur, click or rub, no peripheral edema and peripheral pulses strong  ABDOMEN: soft, nontender, no hepatosplenomegaly, no masses and bowel sounds normal  MS: no gross musculoskeletal " defects noted, no edema      ASSESSMENT/PLAN:       ICD-10-CM    1. Benign essential hypertension I10 Well controlled.  lisinopril (PRINIVIL/ZESTRIL) 40 MG tablet     2. Hyperlipidemia LDL goal <130 E78.5 Well controlled.  pravastatin (PRAVACHOL) 10 MG tablet     3. Anxiety F41.9 Well controlled.  hydrOXYzine (ATARAX) 25 MG tablet     4. Need for prophylactic vaccination and inoculation against influenza Z23 FLU VACCINE, (RIV4) RECOMBINANT HA  , IM (FluBlok, egg free) [55381]- >18 YRS ([facility] recommended  50-64 YRS)     Vaccine Administration, Initial [01128]   5. Special screening for malignant neoplasms, colon Z12.11 Fecal colorectal cancer screen FIT   6. Encounter for screening mammogram for breast cancer Z12.31 *MA Screening Digital Bilateral       Patient Instructions   Return FIT test        The risks, benefits and treatment options of prescribed medications or other treatments have been discussed with the patient. The patient verbalized their understanding and should call or follow up if no improvement or if they develop further problems.    JUN Ngo [facility]    Injectable Influenza Immunization Documentation    1.  Is the person to be vaccinated sick today?   No    2. Does the person to be vaccinated have an allergy to a component   of the vaccine?   No  Egg Allergy Algorithm Link    3. Has the person to be vaccinated ever had a serious reaction   to influenza vaccine in the past?   No    4. Has the person to be vaccinated ever had Guillain-Barré syndrome?   No    Form completed by TRINH LONGORIA

## 2018-11-02 NOTE — PATIENT INSTRUCTIONS
Return FIT test        Thank you for choosing [facility].  You may be receiving a survey in the mail from [employer] regarding your visit today.  Please take a few minutes to complete and return the survey to let us know how we are doing.      If you have questions or concerns, please contact us via Augure or you can contact your care team at [phone].    Our Clinic hours are:  Monday 6:40 am  to 7:00 pm  Tuesday -Friday 6:40 am to 5:00 pm    The [place] outpatient lab hours are:  Monday - Friday 6:10 am to 4:45 pm  Saturdays 7:00 am to 11:00 am  Appointments are required, call [phone]    If you have clinical questions after hours or would like to schedule an appointment,  call the clinic at [phone].

## 2019-05-15 ENCOUNTER — TELEPHONE (OUTPATIENT)
Dept: FAMILY MEDICINE | Facility: CLINIC | Age: 59
End: 2019-05-15

## 2019-05-15 NOTE — LETTER
Flor Seth  [address] [place] [address]      Dear Flor,    Xochilt Ro NP has been reviewing your chart.  It appears that there are aspects of your care that could be improved.   At this time you are due for a colonoscopy.    Colon Cancer Screening- Recommended every 5-10 years, depending on your history, in order to prevent and detect colon cancer at its earliest stages.  Colon cancer is now the second leading cause of death in the [place] for both men and women and there are over 130,000 new cases and 50,000 deaths per year from colon cancer.  Colonoscopies can prevent 90-95% of these deaths.  Problem lesions can be removed before they ever become cancer.  This test is not only looking for cancer, but also getting rid of precancerous lesions.  You are usually given some sedation which makes the test very comfortable for most people.     If you do not wish to do a colonoscopy or cannot afford to do one, at this time, there is another option.  It is called a Fit test or Fecal Immunochemical Occult Blood Test (take home stool sample kit).  It does not replace the colonoscopy for colorectal cancer screening, but it can detect hidden bleeding in the lower colon.  It does need to be repeated every year and if a positive result is obtained, you would be referred for a colonoscopy.  The FIT test is really easy to do and does not require any diet or medication restrictions and involves only one collection sample.     If you are under/uninsured, we recommend you contact the [employer] Program- They provide free/reduced fee screenings to eligible patients based on family size and income.    [place]- [place]'s cancer screening program;  Toll free 1-681.347.2707-/  [phone]  [employer]    If you have completed either one of these tests or had a flexible sigmoidoscopy in the past five years at another facility, please let us know so that we can update your records.  Please call us  ([phone]) if you have questions or would like to arrange either to do a colonoscopy or obtain the necessary test kit for the Fecal Occult Test.

## 2019-05-15 NOTE — TELEPHONE ENCOUNTER
Panel Management Review          Composite cancer screening  Chart review shows that this patient is due/due soon for the following Colonoscopy  Summary:    Patient is due/failing the following:   COLONOSCOPY    Action needed:   Patient needs referral/order: for colonoscopy vs. FIT    Type of outreach:    Sent letter.    Questions for provider review:    None                                                                                                                                    TRINH LONGORIA

## 2019-07-23 DIAGNOSIS — F41.9 ANXIETY: ICD-10-CM

## 2019-07-24 RX ORDER — HYDROXYZINE HYDROCHLORIDE 25 MG/1
TABLET, FILM COATED ORAL
Qty: 60 TABLET | Refills: 1 | Status: SHIPPED | OUTPATIENT
Start: 2019-07-24 | End: 2020-03-17

## 2019-07-24 NOTE — TELEPHONE ENCOUNTER
"Requested Prescriptions   Pending Prescriptions Disp Refills     hydrOXYzine (ATARAX) 25 MG tablet [Pharmacy Med Name: hydrOXYzine HCL 25MG TAB] 60 tablet 3     Sig: TAKE ONE TABLET BY MOUTH EVERY 8 HOURS AS NEEDED FOR ANXIETY       Antihistamines Protocol Passed - 7/23/2019  9:31 PM        Passed - Recent (12 mo) or future (30 days) visit within the authorizing provider's specialty     Patient had office visit in the last 12 months or has a visit in the next 30 days with authorizing provider or within the authorizing provider's specialty.  See \"Patient Info\" tab in inbasket, or \"Choose Columns\" in Meds & Orders section of the refill encounter.              Passed - Patient is age 3 or older     Apply age and/or weight-based dosing for peds patients age 3 and older.    Forward request to provider for patients under the age of 3.          Passed - Medication is active on med list        Last Written Prescription Date:  11/2/18  Last Fill Quantity: 60,  # refills: 3   Last office visit: 11/2/2018 with prescribing provider:  Jia   Future Office Visit:      "

## 2019-07-24 NOTE — TELEPHONE ENCOUNTER
Prescription approved per [facility] Refill Protocol.    OV notes 11-8-18 - anxiety well controlled and she is to return around Nov 2019.  Meghan DIXON RN

## 2019-08-14 ENCOUNTER — HOSPITAL ENCOUNTER (EMERGENCY)
Facility: CLINIC | Age: 59
Discharge: HOME | End: 2019-08-14
Attending: FAMILY MEDICINE | Admitting: FAMILY MEDICINE
Payer: COMMERCIAL

## 2019-08-14 VITALS
OXYGEN SATURATION: 100 % | WEIGHT: 125 LBS | SYSTOLIC BLOOD PRESSURE: 160 MMHG | TEMPERATURE: 98.1 F | DIASTOLIC BLOOD PRESSURE: 88 MMHG | BODY MASS INDEX: 25.68 KG/M2

## 2019-08-14 DIAGNOSIS — H81.399 PERIPHERAL VERTIGO, UNSPECIFIED LATERALITY: ICD-10-CM

## 2019-08-14 PROCEDURE — 99283 EMERGENCY DEPT VISIT LOW MDM: CPT | Mod: Z6 | Performed by: FAMILY MEDICINE

## 2019-08-14 PROCEDURE — 99283 EMERGENCY DEPT VISIT LOW MDM: CPT

## 2019-08-14 RX ORDER — PROMETHAZINE HYDROCHLORIDE 25 MG/1
25 TABLET ORAL EVERY 6 HOURS PRN
Qty: 10 TABLET | Refills: 0 | Status: SHIPPED | OUTPATIENT
Start: 2019-08-14 | End: 2019-10-24

## 2019-08-14 NOTE — ED PROVIDER NOTES
HPI   The patient is a 59-year-old female presenting with vertigo.  She has had this problem previously, about 4 years ago.  She sews me a prescription of lorazepam which she was given for this problem, specifically.  She took 0.5 mg this morning after she had some dizziness.  When she awoke and she was lying in bed she felt well.  However, as she sat up at the side of the bed the room began to spin but only briefly.  She was able to stand and walk without difficulty.  There is no nausea or vomiting.  She does report having some upper neck pain and stiffness since last evening, after work.  She thinks she may have pulled some muscles.  She denies having a severe headache.  She denies having difficulty with speech or vision.  She denies new weakness or incoordination.  She denies falling or having any injuries.  No fever.  No ear pain or tinnitus.  No nasal congestion or facial pain or pressure.  No dental pain.  No trouble swallowing or breathing.  She denies any recurrent vertigo since that one episode this morning.        Allergies:  Allergies   Allergen Reactions     Vicodin [Hydrocodone-Acetaminophen] Nausea and Vomiting     Problem List:    Patient Active Problem List    Diagnosis Date Noted     Anxiety 09/08/2014     Priority: Medium     24 hr info given 07/02/2012     Priority: Medium     EMERGENCY CARE PLAN  Presenting Problem Signs and Symptoms Treatment Plan    Questions or conerns during clinic hours    I will call the clinic directly     Questions or conerns outside clinic hours    I will call the 24 hour nurse line at [phone]    Patient needs to schedule an appointment    I will call the 24 hour scheduling team at [phone] or clinic directly    Same day treatment     I will call the clinic first, nurse line if after hours, urgent care and express care if needed                                 Fibroid uterus 06/09/2011     Priority: Medium     Menorrhagia 05/23/2011     Priority: Medium      S/p ablation       Hyperlipidemia LDL goal <130 2009     Priority: Medium     Globus hystericus 2009     Priority: Medium     DDx: GERD, Thyroiditis, dysphagia.  Start with TSH and empiric omeprazole and f/u 3-4 weeks if persists       Dyspnea and respiratory abnormality 10/23/2007     Priority: Medium     2007  ?related to hyperventilation/panic - seems to resolve with exercises  Problem list name updated by automated process. Provider to review       Abnromal glucose 2006     Priority: Medium     Iron deficiency anemia 2005     Priority: Medium     Disorder resulting from impaired renal function 2005     Priority: Medium     2007: Normalized  Problem list name updated by automated process. Provider to review       Insomnia 07/15/2005     Priority: Medium     2007  Getting to sleep after night shift can be difficult  Problem list name updated by automated process. Provider to review       Panic disorder without agoraphobia 2005     Priority: Medium     2005: Seen in ER and given ativan.  Screened for depression - zung=35.  Recommend Bourne:  Anxiety and Phobia workbook.  I also recommend counseling.  Consider Clonazepam.       Hypertension goal BP (blood pressure) < 140/90 2005     Priority: Medium      Past Medical History:    Past Medical History:   Diagnosis Date     Anemia      menorrhagia      Past Surgical History:    Past Surgical History:   Procedure Laterality Date      SECTION      twins     DILATION AND CURETTAGE, HYSTEROSCOPY, ABLATE ENDOMETRIUM NOVASURE, COMBINED  2011    D&C HSC ablation     SURGICAL HISTORY OF -   , ,     Vaginal delivery     Family History:    Family History   Problem Relation Age of Onset     C.A.D. Mother      Hypertension Other      Cancer No family hx of      Social History:  Marital Status:   [2]  Social History     Tobacco Use     Smoking status: Never Smoker      Smokeless tobacco: Never Used   Substance Use Topics     Alcohol use: No     Drug use: No      Medications:      promethazine (PHENERGAN) 25 MG tablet   ASPIRIN 81 MG OR TABS   hydrOXYzine (ATARAX) 25 MG tablet   lisinopril (PRINIVIL/ZESTRIL) 40 MG tablet   Multiple Vitamin (MULTI-VITAMIN) per tablet   OMEGA 3 OR   pravastatin (PRAVACHOL) 10 MG tablet     Review of Systems   All other systems reviewed and are negative.      PE   BP: (!) 160/88  Heart Rate: 71  Temp: 98.1  F (36.7  C)  Weight: 56.7 kg (125 lb)  SpO2: 100 %  Physical Exam   Constitutional: She is oriented to person, place, and time. She appears well-developed and well-nourished. No distress.   HENT:   Head: Normocephalic and atraumatic.   Eyes: Conjunctivae and EOM are normal.   Neck: Normal range of motion.   Cardiovascular: Normal rate and regular rhythm.   Pulmonary/Chest: Effort normal.   Abdominal: Soft. There is no tenderness.   Musculoskeletal: Normal range of motion.   Neurological: She is alert and oriented to person, place, and time.   No dysarthria or dysphasia.  CN II-VIII intact grossly.  Moving U/L extremities B.  Strength 5/5 U/L extremities B.  Sensation intact grossly to touch (equal).  Rapid alternating movements intact.  Negative Rhomberg.  Normal finger-to-nose movments.   Skin: Skin is warm and dry.   Psychiatric: She has a normal mood and affect. Her behavior is normal.   Vitals reviewed.      ED COURSE and MDM   0942.  The patient has a history consistent with peripheral vertigo.  Her symptoms came on abruptly with position change and resolved almost as quickly.  She denies having neurological deficits, specifically asked.  No trauma or injury.  No severe headache.  Her neck pain is with activity and palpation, specifically.  I do believe the patient is correct and that this is likely a myofascial strain and spasm.  She is without symptoms currently.  No emergent need for a broad work-up.  Low concern for central cause of  vertigo.  Phenergan prescription provided.  Follow-up discussed and phone numbers provided.    LABS  Labs Ordered and Resulted from Time of ED Arrival Up to the Time of Departure from the ED - No data to display    IMAGING  Images reviewed by me.  Radiology report also reviewed.  No orders to display       Procedures    Medications - No data to display      IMPRESSION       ICD-10-CM    1. Peripheral vertigo, unspecified laterality H81.399             Medication List      Started    promethazine 25 MG tablet  Commonly known as:  PHENERGAN  25 mg, Oral, EVERY 6 HOURS PRN                          Keagan Marques MD  08/14/19 0958

## 2019-08-14 NOTE — ED AVS SNAPSHOT
[facility]  [address] [address]  Phone:  [phone]  Fax:  [phone]                                    Flor Seth   MRN: [id number]    Department:  [facility] Emergency Department   Date of Visit:  8/14/2019           After Visit Summary Signature Page    I have received my discharge instructions, and my questions have been answered. I have discussed any challenges I see with this plan with the nurse or doctor.    ..........................................................................................................................................  Patient/Patient Representative Signature      ..........................................................................................................................................  Patient Representative Print Name and Relationship to Patient    ..................................................               ................................................  Date                                   Time    ..........................................................................................................................................  Reviewed by Signature/Title    ...................................................              ..............................................  Date                                               Time          22EPIC Rev 08/18

## 2019-08-14 NOTE — DISCHARGE INSTRUCTIONS
Return to the Emergency Room if the following occurs:     Severely worsened vertigo, vomiting/dehydration, severe headache, fever >101, or for any concern at anytime.    Or, follow-up with the following provider as we discussed:     Consider follow-up with ENT if not improving over the next few days.  See contact information provided for scheduling.    Medications discussed:    Consider Phenergan for nausea and vertigo, as needed.    If you received pain-relieving or sedating medication during your time in the ER, avoid alcohol, driving automobiles, or working with machinery.  Also, a responsible adult must stay with you.        Call the Nurse Advice Line at [phone] or [phone] for any concern at anytime.

## 2019-10-02 DIAGNOSIS — I10 BENIGN ESSENTIAL HYPERTENSION: ICD-10-CM

## 2019-10-02 NOTE — TELEPHONE ENCOUNTER
Patient is requesting med refilll asap, going out of town 106/19.Janel Iqbal on 10/2/2019 at 12:11 PM

## 2019-10-03 RX ORDER — LISINOPRIL 40 MG/1
TABLET ORAL
Qty: 30 TABLET | Refills: 0 | Status: SHIPPED | OUTPATIENT
Start: 2019-10-03 | End: 2019-10-24

## 2019-10-03 NOTE — TELEPHONE ENCOUNTER
Due for clinic visit in November. 30 day mily fill given. Reminder letter sent.      Janet MAHAJAN RN

## 2019-10-03 NOTE — TELEPHONE ENCOUNTER
"Requested Prescriptions   Pending Prescriptions Disp Refills     lisinopril (PRINIVIL/ZESTRIL) 40 MG tablet [Pharmacy Med Name: LISINOPRIL 40MG TABS] 90 tablet 3     Sig: TAKE ONE TABLET BY MOUTH ONCE DAILY       ACE Inhibitors (Including Combos) Protocol Failed - 10/2/2019 12:56 PM        Failed - Blood pressure under 140/90 in past 12 months     BP Readings from Last 3 Encounters:   08/14/19 (!) 160/88   11/02/18 112/76   11/27/17 135/82                 Failed - Normal serum creatinine on file in past 12 months     Recent Labs   Lab Test 06/06/18  0718   CR 0.70             Failed - Normal serum potassium on file in past 12 months     Recent Labs   Lab Test 06/06/18  0718   POTASSIUM 4.2             Passed - Recent (12 mo) or future (30 days) visit within the authorizing provider's specialty     Patient has had an office visit with the authorizing provider or a provider within the authorizing providers department within the previous 12 mos or has a future within next 30 days. See \"Patient Info\" tab in inbasket, or \"Choose Columns\" in Meds & Orders section of the refill encounter.              Passed - Medication is active on med list        Passed - Patient is age 18 or older        Passed - No active pregnancy on record        Passed - No positive pregnancy test within past 12 months        Last Written Prescription Date:  11/2/2018  Last Fill Quantity: 90,  # refills: 3   Last office visit: 11/2/2018 with prescribing provider:  Jia    Future Office Visit:      "

## 2019-10-24 ENCOUNTER — OFFICE VISIT (OUTPATIENT)
Dept: FAMILY MEDICINE | Facility: CLINIC | Age: 59
End: 2019-10-24
Payer: COMMERCIAL

## 2019-10-24 VITALS
DIASTOLIC BLOOD PRESSURE: 80 MMHG | HEIGHT: 59 IN | WEIGHT: 134 LBS | HEART RATE: 69 BPM | OXYGEN SATURATION: 99 % | SYSTOLIC BLOOD PRESSURE: 122 MMHG | TEMPERATURE: 97.7 F | BODY MASS INDEX: 27.01 KG/M2

## 2019-10-24 DIAGNOSIS — Z23 NEED FOR PROPHYLACTIC VACCINATION AND INOCULATION AGAINST INFLUENZA: ICD-10-CM

## 2019-10-24 DIAGNOSIS — Z12.11 SPECIAL SCREENING FOR MALIGNANT NEOPLASMS, COLON: ICD-10-CM

## 2019-10-24 DIAGNOSIS — E78.5 HYPERLIPIDEMIA LDL GOAL <130: ICD-10-CM

## 2019-10-24 DIAGNOSIS — I10 BENIGN ESSENTIAL HYPERTENSION: Primary | ICD-10-CM

## 2019-10-24 DIAGNOSIS — Z11.59 ENCOUNTER FOR HEPATITIS C SCREENING TEST FOR LOW RISK PATIENT: ICD-10-CM

## 2019-10-24 DIAGNOSIS — Z12.31 VISIT FOR SCREENING MAMMOGRAM: ICD-10-CM

## 2019-10-24 PROCEDURE — 90682 RIV4 VACC RECOMBINANT DNA IM: CPT | Performed by: NURSE PRACTITIONER

## 2019-10-24 PROCEDURE — 99214 OFFICE O/P EST MOD 30 MIN: CPT | Mod: 25 | Performed by: NURSE PRACTITIONER

## 2019-10-24 PROCEDURE — 90471 IMMUNIZATION ADMIN: CPT | Performed by: NURSE PRACTITIONER

## 2019-10-24 RX ORDER — PRAVASTATIN SODIUM 10 MG
10 TABLET ORAL DAILY
Qty: 90 TABLET | Refills: 3 | Status: SHIPPED | OUTPATIENT
Start: 2019-10-24 | End: 2019-10-28 | Stop reason: DRUGHIGH

## 2019-10-24 RX ORDER — LISINOPRIL 40 MG/1
40 TABLET ORAL DAILY
Qty: 90 TABLET | Refills: 3 | Status: SHIPPED | OUTPATIENT
Start: 2019-10-24 | End: 2020-05-26

## 2019-10-24 ASSESSMENT — MIFFLIN-ST. JEOR: SCORE: 1085.51

## 2019-10-24 NOTE — PATIENT INSTRUCTIONS
Return when fasting for labs.      You are due for a screening Mammogram.  Please contact the Diagnostics Registration Department at: [phone] to schedule this appointment.  Your clinic record indicates that you are due for:   Colonoscopy or yearly stool blood testing (FIT)        Thank you for choosing [facility].  You may be receiving an email and/or telephone survey request from [facility] regarding your visit today.  Please take a few minutes to respond to the survey to let us know how we are doing.      If you have questions or concerns, please contact us via [employer] or you can contact your care team at [phone].    Our Clinic hours are:  Monday 6:40 am  to 7:00 pm  Tuesday -Friday 6:40 am to 5:00 pm    The [place] outpatient lab hours are:  Monday - Friday 6:10 am to 4:45 pm  Saturdays 7:00 am to 11:00 am  Appointments are required, call [phone]    If you have clinical questions after hours or would like to schedule an appointment,  call the clinic at [phone].

## 2019-10-24 NOTE — PROGRESS NOTES
"Subjective     Flor Seth is a 59 year old female who presents to clinic today for the following health issues:    HPI   Hyperlipidemia Follow-Up      Are you having any of the following symptoms? (Select all that apply)  No complaints of shortness of breath, chest pain or pressure.  No increased sweating or nausea with activity.  No left-sided neck or arm pain.  No complaints of pain in calves when walking 1-2 blocks.    Are you regularly taking any medication or supplement to lower your cholesterol?   Yes- pravastatin    Are you having muscle aches or other side effects that you think could be caused by your cholesterol lowering medication?  No      Hypertension Follow-up      Do you check your blood pressure regularly outside of the clinic? Yes     Are you following a low salt diet? No, tries not to add salt    Are your blood pressures ever more than 140 on the top number (systolic) OR more   than 90 on the bottom number (diastolic), for example 140/90? Yes      How many servings of fruits and vegetables do you eat daily?  2-3    On average, how many sweetened beverages do you drink each day (soda, juice, sweet tea, etc)?   0-1    How many days per week do you miss taking your medication? 0                  Reviewed and updated as needed this visit by Provider         Review of Systems   ROS COMP: Constitutional, HEENT, cardiovascular, pulmonary, gi and gu systems are negative, except as otherwise noted.      Objective    /80 (BP Location: Right arm)   Pulse 69   Temp 97.7  F (36.5  C) (Tympanic)   Ht 1.486 m (4' 10.5\")   Wt 60.8 kg (134 lb)   SpO2 99%   BMI 27.53 kg/m    Body mass index is 27.53 kg/m .  Physical Exam   GENERAL: healthy, alert and no distress  HENT: ear canals and TM's normal, nose and mouth without ulcers or lesions  NECK: no adenopathy, no asymmetry, masses, or scars and thyroid normal to palpation  RESP: lungs clear to auscultation - no rales, rhonchi or wheezes  CV: regular " "rate and rhythm, normal S1 S2, no S3 or S4, no murmur, click or rub, no peripheral edema and peripheral pulses strong            Assessment & Plan       ICD-10-CM    1. Benign essential hypertension I10 Well controlled.  lisinopril (PRINIVIL/ZESTRIL) 40 MG tablet     Basic metabolic panel     2. Hyperlipidemia LDL goal <130 E78.5 pravastatin (PRAVACHOL) 10 MG tablet     Lipid panel reflex to direct LDL Fasting - will return when fasting for labs     3. Special screening for malignant neoplasms, colon Z12.11 Fecal colorectal cancer screen FIT   4. Visit for screening mammogram Z12.31 *MA Screening Digital Bilateral   5. Encounter for hepatitis C screening test for low risk patient Z11.59 **Hepatitis C Screen Reflex to RNA FUTURE anytime   6. Need for prophylactic vaccination and inoculation against influenza Z23 INFLUENZA QUAD, RECOMBINANT, P-FREE (RIV4) (FLUBLOCK) [48560]     Vaccine Administration, Initial [27169]        BMI:   Estimated body mass index is 27.53 kg/m  as calculated from the following:    Height as of this encounter: 1.486 m (4' 10.5\").    Weight as of this encounter: 60.8 kg (134 lb).   Weight management plan: Discussed healthy diet and exercise guidelines        Patient Instructions   Return when fasting for labs.      You are due for a screening Mammogram.  Please contact the Diagnostics Registration Department at: [phone] to schedule this appointment.  Your clinic record indicates that you are due for:   Colonoscopy or yearly stool blood testing (FIT)        Thank you for choosing [facility].  You may be receiving an email and/or telephone survey request from [facility] Customer Experience regarding your visit today.  Please take a few minutes to respond to the survey to let us know how we are doing.      If you have questions or concerns, please contact us via [employer] or you can contact your care team at [phone].    Our Clinic hours are:  Monday 6:40 am  to 7:00 pm  Tuesday " -Friday 6:40 am to 5:00 pm    The [place] outpatient lab hours are:  Monday - Friday 6:10 am to 4:45 pm  Saturdays 7:00 am to 11:00 am  Appointments are required, call [phone]    If you have clinical questions after hours or would like to schedule an appointment,  call the clinic at [phone].        Return in about 1 year (around 10/24/2020) for Physical Exam.    The risks, benefits and treatment options of prescribed medications or other treatments have been discussed with the patient. The patient verbalized their understanding and should call or follow up if no improvement or if they develop further problems.    JUN Arreguin [facility]

## 2019-10-26 DIAGNOSIS — Z11.59 ENCOUNTER FOR HEPATITIS C SCREENING TEST FOR LOW RISK PATIENT: ICD-10-CM

## 2019-10-26 DIAGNOSIS — I10 BENIGN ESSENTIAL HYPERTENSION: ICD-10-CM

## 2019-10-26 DIAGNOSIS — E78.5 HYPERLIPIDEMIA LDL GOAL <130: ICD-10-CM

## 2019-10-26 LAB
ANION GAP SERPL CALCULATED.3IONS-SCNC: 4 MMOL/L (ref 3–14)
BUN SERPL-MCNC: 22 MG/DL (ref 7–30)
CALCIUM SERPL-MCNC: 8.8 MG/DL (ref 8.5–10.1)
CHLORIDE SERPL-SCNC: 106 MMOL/L (ref 94–109)
CHOLEST SERPL-MCNC: 214 MG/DL
CO2 SERPL-SCNC: 27 MMOL/L (ref 20–32)
CREAT SERPL-MCNC: 0.73 MG/DL (ref 0.52–1.04)
GFR SERPL CREATININE-BSD FRML MDRD: >90 ML/MIN/{1.73_M2}
GLUCOSE SERPL-MCNC: 103 MG/DL (ref 70–99)
HDLC SERPL-MCNC: 71 MG/DL
LDLC SERPL CALC-MCNC: 128 MG/DL
NONHDLC SERPL-MCNC: 143 MG/DL
POTASSIUM SERPL-SCNC: 4.1 MMOL/L (ref 3.4–5.3)
SODIUM SERPL-SCNC: 137 MMOL/L (ref 133–144)
TRIGL SERPL-MCNC: 77 MG/DL

## 2019-10-26 PROCEDURE — 36415 COLL VENOUS BLD VENIPUNCTURE: CPT | Performed by: NURSE PRACTITIONER

## 2019-10-26 PROCEDURE — 80048 BASIC METABOLIC PNL TOTAL CA: CPT | Performed by: NURSE PRACTITIONER

## 2019-10-26 PROCEDURE — 80061 LIPID PANEL: CPT | Performed by: NURSE PRACTITIONER

## 2019-10-26 PROCEDURE — 86803 HEPATITIS C AB TEST: CPT | Performed by: NURSE PRACTITIONER

## 2019-10-28 DIAGNOSIS — E78.5 HYPERLIPIDEMIA LDL GOAL <130: Primary | ICD-10-CM

## 2019-10-28 LAB — HCV AB SERPL QL IA: NONREACTIVE

## 2019-10-28 RX ORDER — PRAVASTATIN SODIUM 20 MG
20 TABLET ORAL DAILY
Qty: 90 TABLET | Refills: 3 | Status: SHIPPED | OUTPATIENT
Start: 2019-10-28 | End: 2020-11-06

## 2019-11-26 ENCOUNTER — ANESTHESIA EVENT (OUTPATIENT)
Dept: SURGERY | Facility: CLINIC | Age: 59
End: 2019-11-26
Payer: COMMERCIAL

## 2019-11-26 NOTE — ANESTHESIA PREPROCEDURE EVALUATION
Anesthesia Pre-Procedure Evaluation    Patient: Flor Seth   MRN: [id number] : 1960          Preoperative Diagnosis: Cataract [H26.9]    Procedure(s):  Cataract removal with implant. (Symfony lens)    Past Medical History:   Diagnosis Date     Anemia      Hypertension      Iron deficiency anemia 2005     menorrhagia      Menorrhagia 2011    S/p ablation      Past Surgical History:   Procedure Laterality Date      SECTION      twins     DILATION AND CURETTAGE, HYSTEROSCOPY, ABLATE ENDOMETRIUM NOVASURE, COMBINED  2011    D&C HSC ablation     SURGICAL HISTORY OF -   , ,     Vaginal delivery       Anesthesia Evaluation     . Pt has had prior anesthetic.            ROS/MED HX    ENT/Pulmonary:       Neurologic:       Cardiovascular:     (+) Dyslipidemia, hypertension----. : . . . :. .       METS/Exercise Tolerance:     Hematologic:     (+) Anemia, -      Musculoskeletal:         GI/Hepatic:         Renal/Genitourinary:         Endo:         Psychiatric:     (+) psychiatric history anxiety and other (comment)      Infectious Disease:         Malignancy:         Other:                          Physical Exam  Normal systems: cardiovascular, pulmonary and dental    Airway   Mallampati: II  TM distance: >3 FB  Neck ROM: full    Dental     Cardiovascular       Pulmonary             Lab Results   Component Value Date    WBC 9.8 2012    HGB 12.0 2014    HCT 36.1 2012     2012     10/26/2019    POTASSIUM 4.1 10/26/2019    CHLORIDE 106 10/26/2019    CO2 27 10/26/2019    BUN 22 10/26/2019    CR 0.73 10/26/2019     (H) 10/26/2019    REGINALDO 8.8 10/26/2019    PHOS 2.8 2007    MAG 2.0 2007    ALBUMIN 4.6 2008    PROTTOTAL 8.5 (H) 2008    ALT 9 2012    AST 23 2012    ALKPHOS 87 2008    BILITOTAL 0.5 2008    LIPASE 154 2006    AMYLASE 56 2005    PTT 26 2005    INR 1.00 2005    TSH  "0.66 06/05/2012    HCG Negative 06/09/2011       Preop Vitals  BP Readings from Last 3 Encounters:   10/24/19 122/80   08/14/19 (!) 160/88   11/02/18 112/76    Pulse Readings from Last 3 Encounters:   10/24/19 69   11/02/18 52   11/27/17 67      Resp Readings from Last 3 Encounters:   11/02/18 12   04/14/15 16   05/22/12 16    SpO2 Readings from Last 3 Encounters:   10/24/19 99%   08/14/19 100%   12/21/13 100%      Temp Readings from Last 1 Encounters:   10/24/19 36.5  C (97.7  F) (Tympanic)    Ht Readings from Last 1 Encounters:   10/24/19 1.486 m (4' 10.5\")      Wt Readings from Last 1 Encounters:   10/24/19 60.8 kg (134 lb)    Estimated body mass index is 27.53 kg/m  as calculated from the following:    Height as of 10/24/19: 1.486 m (4' 10.5\").    Weight as of 10/24/19: 60.8 kg (134 lb).       Anesthesia Plan      History & Physical Review  History and physical reviewed and following examination; no interval change.    ASA Status:  3 .    NPO Status:  > 6 hours    Plan for MAC Reason for MAC:  Procedure to face, neck, head or breast         Postoperative Care      Consents  Anesthetic plan, risks, benefits and alternatives discussed with:  Patient..                 JUN Mills CRNA  "

## 2019-11-26 NOTE — H&P
[facility]  [facility]  [address]  [facility] [address]  [phone]  Dept: [phone]    OPHTHALMOLOGY PRE-OPERATIVE  HISTORY AND PHYSICAL    DATE OF H/P:  2019    DATE OF SURGERY:  2019  PROCEDURE:  Procedure(s):  Cataract removal with implant. (Symfony lens), Left Eye  LENS IMPLANT:  [id number] +17.0  REFRACTIVE GOAL:  PL Sph  SURGEON:  Carmelo Lock MD    ANESTHESIA:  TOPICAL / MAC    OR CASE REQUIREMENTS:  Symfony toric IOL with axis at 119 degrees.    DEMOGRAPHICS:  Demographic Information on Flor Seth:    Flor Seth  Gender: female  : 1960  [address] [address]  [phone] (home) [phone] (work)    Medical Record: [id number]  Social Security Number: [id number]  Pharmacy: [facility] [address]  Primary Care Provider: Xochilt Ro    Parent's names are: Data Unavailable (mother) and Data Unavailable (father).    Insurance: Payor: BCBS / Plan: BCBS OUT OF STATE / Product Type: Indemnity /     OCULAR HISTORY:  Cataracts, each eye.  Astigmatism / presbyopia.    HISTORIES:  Past Medical History:   Diagnosis Date     Anemia      Hypertension      Iron deficiency anemia 2005     menorrhagia      Menorrhagia 2011    S/p ablation        Past Surgical History:   Procedure Laterality Date      SECTION      twins     DILATION AND CURETTAGE, HYSTEROSCOPY, ABLATE ENDOMETRIUM NOVASURE, COMBINED  2011    D&C HSC ablation     SURGICAL HISTORY OF -   , ,     Vaginal delivery       Family History   Problem Relation Age of Onset     C.A.D. Mother      Hypertension Other      Cancer No family hx of        Social History     Tobacco Use     Smoking status: Never Smoker     Smokeless tobacco: Never Used   Substance Use Topics     Alcohol use: No       MEDICATIONS:  No current facility-administered medications for this encounter.      Current Outpatient  Medications   Medication Sig     ASPIRIN 81 MG OR TABS 1 TABLET BY MOUTH DAILY     hydrOXYzine (ATARAX) 25 MG tablet TAKE ONE TABLET BY MOUTH EVERY 8 HOURS AS NEEDED FOR ANXIETY     lisinopril (PRINIVIL/ZESTRIL) 40 MG tablet Take 1 tablet (40 mg) by mouth daily     Multiple Vitamin (MULTI-VITAMIN) per tablet Take 1 tablet by mouth daily.     OMEGA 3 OR Once daily     pravastatin (PRAVACHOL) 20 MG tablet Take 1 tablet (20 mg) by mouth daily       ALLERGIES:     Allergies   Allergen Reactions     Vicodin [Hydrocodone-Acetaminophen] Nausea and Vomiting       PERTINENT SYSTEMS REVIEW:    1. No - Do you have a history of heart attack, stroke, stent, bypass or surgery on an artery in the head, neck, heart or legs?  2. No - Do you ever have any pain or discomfort in your chest?  3. No - Do you have a history of  Heart Failure?  4. No - Are you troubled by shortness of breath when walking: On the level, up a slight hill or at night?  5. No - Do you currently have a cold, bronchitis or other respiratory infection?  6. No - Do you have a cough, shortness of breath or wheezing?  7. No - Do you sometimes get pains in the calves of your legs when you walk?  8. No - Do you or anyone in your family have previous history of blood clots?  9. No - Do you or does anyone in your family have a serious bleeding problem such as prolonged bleeding following surgeries or cuts?  10. No - Have you ever had problems with anemia or been told to take iron pills?  11. No - Have you had any abnormal blood loss such as black, tarry or bloody stools, or abnormal vaginal bleeding?  12. No - Have you ever had a blood transfusion?  13. No - Have you or any of your relatives ever had problems with anesthesia?  14. No - Do you have sleep apnea, excessive snoring or daytime drowsiness?  15. No - Do you have any prosthetic heart valves?  16. No - Do you have prosthetic joints?    EXAMINATION:  Vitals were reviewed  Vison:  Va, right - 20/40, left - 20/100;    BAT, right - 20/100, left - 20/200;  HEENT:  Cataract, otherwise unremarkable.  LUNGS:  Clear  CV:  Regular rate and rhythm without murmur  ABD:  Soft and nontender  NEURO:  Alert and nonfocal    IMPRESSION:  Patient cleared for ophthalmic surgery.  Low risk with monitored, light sedation.  I have assessed the patient's DVT risk, and no additional orders necessary.    PLAN:  Procedure(s):  Cataract removal with implant. (Symfony lens), Left Eye      Carmelo Lock MD

## 2019-11-27 ENCOUNTER — HOSPITAL ENCOUNTER (OUTPATIENT)
Facility: CLINIC | Age: 59
Discharge: HOME | End: 2019-11-27
Attending: OPHTHALMOLOGY | Admitting: OPHTHALMOLOGY

## 2019-11-27 ENCOUNTER — ANESTHESIA (OUTPATIENT)
Dept: SURGERY | Facility: CLINIC | Age: 59
End: 2019-11-27
Payer: COMMERCIAL

## 2019-11-27 ENCOUNTER — HOSPITAL ENCOUNTER (OUTPATIENT)
Facility: CLINIC | Age: 59
Discharge: HOME | End: 2019-11-27
Attending: OPHTHALMOLOGY | Admitting: OPHTHALMOLOGY
Payer: COMMERCIAL

## 2019-11-27 VITALS
DIASTOLIC BLOOD PRESSURE: 82 MMHG | BODY MASS INDEX: 27.29 KG/M2 | WEIGHT: 130 LBS | OXYGEN SATURATION: 98 % | HEIGHT: 58 IN | RESPIRATION RATE: 18 BRPM | SYSTOLIC BLOOD PRESSURE: 145 MMHG | TEMPERATURE: 98.3 F

## 2019-11-27 PROCEDURE — 27210794 ZZH OR GENERAL SUPPLY STERILE: Performed by: OPHTHALMOLOGY

## 2019-11-27 PROCEDURE — 71000022 ZZH RECOVERY CATRACT PACKAGE: Performed by: OPHTHALMOLOGY

## 2019-11-27 PROCEDURE — 25000125 ZZHC RX 250: Performed by: NURSE ANESTHETIST, CERTIFIED REGISTERED

## 2019-11-27 PROCEDURE — 25000125 ZZHC RX 250: Performed by: OPHTHALMOLOGY

## 2019-11-27 PROCEDURE — V2632 POST CHMBR INTRAOCULAR LENS: HCPCS | Performed by: OPHTHALMOLOGY

## 2019-11-27 PROCEDURE — 25000128 H RX IP 250 OP 636: Performed by: NURSE ANESTHETIST, CERTIFIED REGISTERED

## 2019-11-27 PROCEDURE — V2788 PRESBYOPIA-CORRECT FUNCTION: HCPCS

## 2019-11-27 PROCEDURE — 37000012 ZZH ANESTHESIA CATARACT PACKAGE: Performed by: OPHTHALMOLOGY

## 2019-11-27 PROCEDURE — V2788 PRESBYOPIA-CORRECT FUNCTION: HCPCS | Performed by: OPHTHALMOLOGY

## 2019-11-27 PROCEDURE — 36000025 ZZH CATARACT SURGICAL PACKAGE: Performed by: OPHTHALMOLOGY

## 2019-11-27 PROCEDURE — 25800030 ZZH RX IP 258 OP 636: Performed by: NURSE ANESTHETIST, CERTIFIED REGISTERED

## 2019-11-27 PROCEDURE — 25000128 H RX IP 250 OP 636: Performed by: OPHTHALMOLOGY

## 2019-11-27 RX ORDER — CYCLOPENTOLATE HYDROCHLORIDE 10 MG/ML
1 SOLUTION/ DROPS OPHTHALMIC
Status: COMPLETED | OUTPATIENT
Start: 2019-11-27 | End: 2019-11-27

## 2019-11-27 RX ORDER — LIDOCAINE HYDROCHLORIDE 20 MG/ML
JELLY TOPICAL PRN
Status: DISCONTINUED | OUTPATIENT
Start: 2019-11-27 | End: 2019-11-27 | Stop reason: HOSPADM

## 2019-11-27 RX ORDER — PHENYLEPHRINE HYDROCHLORIDE 25 MG/ML
1 SOLUTION/ DROPS OPHTHALMIC
Status: COMPLETED | OUTPATIENT
Start: 2019-11-27 | End: 2019-11-27

## 2019-11-27 RX ORDER — BALANCED SALT SOLUTION 6.4; .75; .48; .3; 3.9; 1.7 MG/ML; MG/ML; MG/ML; MG/ML; MG/ML; MG/ML
SOLUTION OPHTHALMIC PRN
Status: DISCONTINUED | OUTPATIENT
Start: 2019-11-27 | End: 2019-11-27 | Stop reason: HOSPADM

## 2019-11-27 RX ORDER — SODIUM CHLORIDE, SODIUM LACTATE, POTASSIUM CHLORIDE, CALCIUM CHLORIDE 600; 310; 30; 20 MG/100ML; MG/100ML; MG/100ML; MG/100ML
INJECTION, SOLUTION INTRAVENOUS CONTINUOUS
Status: DISCONTINUED | OUTPATIENT
Start: 2019-11-27 | End: 2019-11-27 | Stop reason: HOSPADM

## 2019-11-27 RX ORDER — LIDOCAINE 40 MG/G
CREAM TOPICAL
Status: DISCONTINUED | OUTPATIENT
Start: 2019-11-27 | End: 2019-11-27 | Stop reason: HOSPADM

## 2019-11-27 RX ORDER — PROPARACAINE HYDROCHLORIDE 5 MG/ML
SOLUTION/ DROPS OPHTHALMIC PRN
Status: DISCONTINUED | OUTPATIENT
Start: 2019-11-27 | End: 2019-11-27 | Stop reason: HOSPADM

## 2019-11-27 RX ORDER — TROPICAMIDE 10 MG/ML
1 SOLUTION/ DROPS OPHTHALMIC
Status: COMPLETED | OUTPATIENT
Start: 2019-11-27 | End: 2019-11-27

## 2019-11-27 RX ADMIN — CYCLOPENTOLATE HYDROCHLORIDE 1 DROP: 10 SOLUTION/ DROPS OPHTHALMIC at 08:42

## 2019-11-27 RX ADMIN — PHENYLEPHRINE HYDROCHLORIDE 1 DROP: 25 SOLUTION/ DROPS OPHTHALMIC at 08:57

## 2019-11-27 RX ADMIN — CYCLOPENTOLATE HYDROCHLORIDE 1 DROP: 10 SOLUTION/ DROPS OPHTHALMIC at 08:50

## 2019-11-27 RX ADMIN — SODIUM CHLORIDE, POTASSIUM CHLORIDE, SODIUM LACTATE AND CALCIUM CHLORIDE: 600; 310; 30; 20 INJECTION, SOLUTION INTRAVENOUS at 08:58

## 2019-11-27 RX ADMIN — LIDOCAINE HYDROCHLORIDE 1 ML: 10 INJECTION, SOLUTION EPIDURAL; INFILTRATION; INTRACAUDAL; PERINEURAL at 08:59

## 2019-11-27 RX ADMIN — TROPICAMIDE 1 DROP: 10 SOLUTION/ DROPS OPHTHALMIC at 08:50

## 2019-11-27 RX ADMIN — TROPICAMIDE 1 DROP: 10 SOLUTION/ DROPS OPHTHALMIC at 08:42

## 2019-11-27 RX ADMIN — CYCLOPENTOLATE HYDROCHLORIDE 1 DROP: 10 SOLUTION/ DROPS OPHTHALMIC at 08:57

## 2019-11-27 RX ADMIN — PHENYLEPHRINE HYDROCHLORIDE 1 DROP: 25 SOLUTION/ DROPS OPHTHALMIC at 08:50

## 2019-11-27 RX ADMIN — TROPICAMIDE 1 DROP: 10 SOLUTION/ DROPS OPHTHALMIC at 08:57

## 2019-11-27 RX ADMIN — MIDAZOLAM 2 MG: 1 INJECTION INTRAMUSCULAR; INTRAVENOUS at 09:58

## 2019-11-27 RX ADMIN — PHENYLEPHRINE HYDROCHLORIDE 1 DROP: 25 SOLUTION/ DROPS OPHTHALMIC at 08:42

## 2019-11-27 RX ADMIN — SODIUM CHLORIDE, POTASSIUM CHLORIDE, SODIUM LACTATE AND CALCIUM CHLORIDE: 600; 310; 30; 20 INJECTION, SOLUTION INTRAVENOUS at 09:57

## 2019-11-27 ASSESSMENT — MIFFLIN-ST. JEOR: SCORE: 1059.43

## 2019-11-27 NOTE — ANESTHESIA CARE TRANSFER NOTE
Patient: Flor Joneser    Procedure(s):  Cataract removal with implant. (Symfony lens)    Diagnosis: Cataract [H26.9]  Diagnosis Additional Information: No value filed.    Anesthesia Type:   MAC     Note:    Patient transferred to:Phase II  Handoff Report: Identifed the Patient, Identified the Reponsible Provider, Reviewed the pertinent medical history, Discussed the surgical course, Reviewed Intra-OP anesthesia mangement and issues during anesthesia, Set expectations for post-procedure period and Allowed opportunity for questions and acknowledgement of understanding      Vitals: (Last set prior to Anesthesia Care Transfer)    CRNA VITALS  11/27/2019 0947 - 11/27/2019 1017      11/27/2019             NIBP:  142/78    Pulse:  65    NIBP Mean:  103    SpO2:  99 %                Electronically Signed By: Annalise Childers CRNA, JUN GOYAL  November 27, 2019  10:17 AM

## 2019-11-27 NOTE — OP NOTE
OPHTHALMOLOGY OPERATIVE NOTE    PATIENT: Flor Seth  DATE OF SURGERY: 11/27/2019  PREOPERATIVE DIAGNOSIS:  Senile Nuclear Cataract, Left eye  POSTOPERATIVE DIAGNOSIS:  Senile Nuclear Cataract, Left eye  OPERATIVE PROCEDURE:  Phacoemulsification with placement of advanced toric multifocal intraocular lens  SURGEON:  Carmelo Lock MD  ANESTHESIA:  Topical / MAC  EBL:  None  SPECIMENS:  None  COMPLICATIONS:  None    PROCEDURE:  The patient was brought to the operating room at [facility].  The eye was anesthetized with topical anesthetic, and a sterile marking pen was used to raj the limbus at the 0 and 180 axis.  The left eye was prepped and draped in the usual fashion for cataract surgery.  A wire lid speculum was inserted.  A super sharp blade was used to make a paracentesis at the 5 O'clock position.  The super sharp blade was used to make a partial thickness temporal groove, which was 3 mm in length.  0.8 mL of non-preserved epi-Shugarcaine was injected into the anterior chamber.  Viscoelastic was used to inflate the anterior chamber through a cannula.  A 2.5 mm microkeratome was used to make a temporal clear corneal incision in a two-plane fashion.  A cystotome needle and forceps were used to make a capsulorrhexis.  Hydrodissection and hydrodelineation were performed with Balance Salt Solution.  The lens was then phacoemulsified and removed without complications.  The cortical material was removed with bimanual irrigation and aspiration.  The capsular bag was filled with viscoelastic.  A posterior chamber intraocular lens, preselected and recorded, was folded and inserted into the capsular bag.  The lens was then rotated to the correct axis according to the preoperative data, using the limbal markings and an axial gauge.  The viscoelastic was removed with the irrigation and aspiration tip.  Balanced Salt Solution with Vigamox, 150mg/0.1mL,was used to refill the anterior  chamber, and make final lens axis adjustments.  The wounds were checked for water tightness and required no suture.  The wire lid speculum was removed.  The patient's left eye was cleaned and a drop of each post-operative drop was placed, followed by a auguste shield.  The patient tolerated the procedure well, and there were no complications.      Carmelo Lock MD

## 2019-11-27 NOTE — ANESTHESIA POSTPROCEDURE EVALUATION
Patient: Flor Seth    Procedure(s):  Cataract removal with implant. (Symfony lens)    Diagnosis:Cataract [H26.9]  Diagnosis Additional Information: No value filed.    Anesthesia Type:  MAC    Note:  Anesthesia Post Evaluation    Patient location during evaluation: Bedside  Patient participation: Able to fully participate in evaluation  Level of consciousness: awake and alert  Pain management: adequate  Airway patency: patent  Cardiovascular status: acceptable  Respiratory status: acceptable  Hydration status: acceptable  PONV: none     Anesthetic complications: None          Last vitals:  Vitals:    11/27/19 0840   BP: (!) 147/88   Resp: 18   Temp: 36.8  C (98.3  F)   SpO2: 100%         Electronically Signed By: Annalise Childers CRNA, JUN GOYAL  November 27, 2019  10:15 AM

## 2019-12-12 NOTE — H&P
[facility]  T[facility]  [address]  [facility] [address]  [phone]  Dept: [phone]    OPHTHALMOLOGY PRE-OPERATIVE  HISTORY AND PHYSICAL    DATE OF H/P:  12/10/2019    DATE OF SURGERY:  2019  PROCEDURE:  Procedure(s):  Cataract removal with implant. (Symfony lens), Right Eye  LENS IMPLANT:  [id number] +16.5  REFRACTIVE GOAL:  PL Sph  SURGEON:  Carmelo Lock MD    ANESTHESIA:  TOPICAL / MAC    OR CASE REQUIREMENTS:  Symfony toric IOL with axis at 87 degrees.    DEMOGRAPHICS:  Demographic Information on Flor Seth:    Flor Seth  Gender: female  : 1960  [address] [address]  [phone] (home) [phone] (work)    Medical Record: [id number]  Social Security Number: [id number]  Pharmacy: [facility] [address] [facility]  Primary Care Provider: Xochilt Ro    Parent's names are: Data Unavailable (mother) and Data Unavailable (father).    Insurance: Payor: BCBS / Plan: BCBS OUT OF STATE / Product Type: Indemnity /     OCULAR HISTORY:  Cataracts, s/p IOL left eye.  Astigmatism / presbyopia.    HISTORIES:  Past Medical History:   Diagnosis Date     Anemia      Hypertension      Iron deficiency anemia 2005     menorrhagia      Menorrhagia 2011    S/p ablation        Past Surgical History:   Procedure Laterality Date      SECTION      twins     DILATION AND CURETTAGE, HYSTEROSCOPY, ABLATE ENDOMETRIUM NOVASURE, COMBINED  2011    D&C HSC ablation     PHACOEMULSIFICATION CLEAR CORNEA WITH TORIC INTRAOCULAR LENS IMPLANT Left 2019    Procedure: Cataract removal with implant. (Symfony lens);  Surgeon: Carmelo Lock MD;  Location: [facility] OR     SURGICAL HISTORY OF -   , ,     Vaginal delivery       Family History   Problem Relation Age of Onset     C.A.D. Mother      Hypertension Other      Cancer No family hx of        Social History     Tobacco Use     Smoking  status: Never Smoker     Smokeless tobacco: Never Used   Substance Use Topics     Alcohol use: No       MEDICATIONS:  No current facility-administered medications for this encounter.      Current Outpatient Medications   Medication Sig     ASPIRIN 81 MG OR TABS 1 TABLET BY MOUTH DAILY     hydrOXYzine (ATARAX) 25 MG tablet TAKE ONE TABLET BY MOUTH EVERY 8 HOURS AS NEEDED FOR ANXIETY     lisinopril (PRINIVIL/ZESTRIL) 40 MG tablet Take 1 tablet (40 mg) by mouth daily     Multiple Vitamin (MULTI-VITAMIN) per tablet Take 1 tablet by mouth daily.     OMEGA 3 OR Once daily     pravastatin (PRAVACHOL) 20 MG tablet Take 1 tablet (20 mg) by mouth daily       ALLERGIES:     Allergies   Allergen Reactions     Vicodin [Hydrocodone-Acetaminophen] Nausea and Vomiting       PERTINENT SYSTEMS REVIEW:    1. No - Do you have a history of heart attack, stroke, stent, bypass or surgery on an artery in the head, neck, heart or legs?  2. No - Do you ever have any pain or discomfort in your chest?  3. No - Do you have a history of  Heart Failure?  4. No - Are you troubled by shortness of breath when walking: On the level, up a slight hill or at night?  5. No - Do you currently have a cold, bronchitis or other respiratory infection?  6. No - Do you have a cough, shortness of breath or wheezing?  7. No - Do you sometimes get pains in the calves of your legs when you walk?  8. No - Do you or anyone in your family have previous history of blood clots?  9. No - Do you or does anyone in your family have a serious bleeding problem such as prolonged bleeding following surgeries or cuts?  10. No - Have you ever had problems with anemia or been told to take iron pills?  11. No - Have you had any abnormal blood loss such as black, tarry or bloody stools, or abnormal vaginal bleeding?  12. No - Have you ever had a blood transfusion?  13. No - Have you or any of your relatives ever had problems with anesthesia?  14. No - Do you have sleep apnea,  excessive snoring or daytime drowsiness?  15. No - Do you have any prosthetic heart valves?  16. No - Do you have prosthetic joints?    EXAMINATION:  Vitals were reviewed  Vison:  Va, right - 20/40;   BAT, right - 20/100;  HEENT:  Cataract, otherwise unremarkable.  LUNGS:  Clear  CV:  Regular rate and rhythm without murmur  ABD:  Soft and nontender  NEURO:  Alert and nonfocal    IMPRESSION:  Patient cleared for ophthalmic surgery.  Low risk with monitored, light sedation.  I have assessed the patient's DVT risk, and no additional orders necessary.    PLAN:  Procedure(s):  Cataract removal with implant. (Symfony lens), Right Eye      Carmelo Lock MD

## 2019-12-13 ENCOUNTER — ANESTHESIA EVENT (OUTPATIENT)
Dept: SURGERY | Facility: CLINIC | Age: 59
End: 2019-12-13
Payer: COMMERCIAL

## 2019-12-13 NOTE — ANESTHESIA PREPROCEDURE EVALUATION
Anesthesia Pre-Procedure Evaluation    Patient: Flor Seth   MRN: [id number] : 1960          Preoperative Diagnosis: Cataract [H26.9]    Procedure(s):  Cataract removal with implant. (Symfony lens)    Past Medical History:   Diagnosis Date     Anemia      Hypertension      Iron deficiency anemia 2005     menorrhagia      Menorrhagia 2011    S/p ablation      Past Surgical History:   Procedure Laterality Date      SECTION      twins     DILATION AND CURETTAGE, HYSTEROSCOPY, ABLATE ENDOMETRIUM NOVASURE, COMBINED  2011    D&C HSC ablation     PHACOEMULSIFICATION CLEAR CORNEA WITH TORIC INTRAOCULAR LENS IMPLANT Left 2019    Procedure: Cataract removal with implant. (Symfony lens);  Surgeon: Carmelo Lock MD;  Location: [facility] OR     SURGICAL HISTORY OF -   , ,     Vaginal delivery       Anesthesia Evaluation     . Pt has had prior anesthetic. Type: General, MAC and Regional    No history of anesthetic complications          ROS/MED HX    ENT/Pulmonary:  - neg pulmonary ROS     Neurologic:  - neg neurologic ROS     Cardiovascular:     (+) Dyslipidemia, hypertension----. : . . . :. .       METS/Exercise Tolerance:  >4 METS   Hematologic:     (+) Anemia, -      Musculoskeletal:  - neg musculoskeletal ROS       GI/Hepatic:  - neg GI/hepatic ROS       Renal/Genitourinary:  - ROS Renal section negative       Endo:  - neg endo ROS       Psychiatric:     (+) psychiatric history anxiety and other (comment)      Infectious Disease:  - neg infectious disease ROS       Malignancy:      - no malignancy   Other:    - neg other ROS                        Physical Exam  Normal systems: cardiovascular and pulmonary    Airway   Mallampati: II  TM distance: >3 FB  Neck ROM: full    Dental   (+) upper dentures and lower dentures    Cardiovascular       Pulmonary             Lab Results   Component Value Date    WBC 9.8 2012    HGB 12.0 2014    HCT 36.1 2012    PLT  "312 05/22/2012     10/26/2019    POTASSIUM 4.1 10/26/2019    CHLORIDE 106 10/26/2019    CO2 27 10/26/2019    BUN 22 10/26/2019    CR 0.73 10/26/2019     (H) 10/26/2019    REGINALDO 8.8 10/26/2019    PHOS 2.8 03/23/2007    MAG 2.0 03/23/2007    ALBUMIN 4.6 05/13/2008    PROTTOTAL 8.5 (H) 05/13/2008    ALT 9 06/05/2012    AST 23 06/05/2012    ALKPHOS 87 05/13/2008    BILITOTAL 0.5 05/13/2008    LIPASE 154 01/26/2006    AMYLASE 56 07/05/2005    PTT 26 07/05/2005    INR 1.00 07/05/2005    TSH 0.66 06/05/2012    HCG Negative 06/09/2011       Preop Vitals  BP Readings from Last 3 Encounters:   11/27/19 (!) 145/82   10/24/19 122/80   08/14/19 (!) 160/88    Pulse Readings from Last 3 Encounters:   10/24/19 69   11/02/18 52   11/27/17 67      Resp Readings from Last 3 Encounters:   11/27/19 18   11/02/18 12   04/14/15 16    SpO2 Readings from Last 3 Encounters:   11/27/19 98%   10/24/19 99%   08/14/19 100%      Temp Readings from Last 1 Encounters:   11/27/19 36.8  C (98.3  F) (Oral)    Ht Readings from Last 1 Encounters:   11/27/19 1.473 m (4' 10\")      Wt Readings from Last 1 Encounters:   11/27/19 59 kg (130 lb)    Estimated body mass index is 27.17 kg/m  as calculated from the following:    Height as of 11/27/19: 1.473 m (4' 10\").    Weight as of 11/27/19: 59 kg (130 lb).       Anesthesia Plan      History & Physical Review  History and physical reviewed and following examination; no interval change.    ASA Status:  2 .    NPO Status:  > 6 hours    Plan for MAC Reason for MAC:  Procedure to face, neck, head or breast         Postoperative Care      Consents  Anesthetic plan, risks, benefits and alternatives discussed with:  Patient and Spouse..                   JUN Hall CRNA  "

## 2019-12-16 ENCOUNTER — ANESTHESIA (OUTPATIENT)
Dept: SURGERY | Facility: CLINIC | Age: 59
End: 2019-12-16
Payer: COMMERCIAL

## 2019-12-16 ENCOUNTER — HOSPITAL ENCOUNTER (OUTPATIENT)
Facility: CLINIC | Age: 59
Discharge: HOME | End: 2019-12-16
Attending: OPHTHALMOLOGY | Admitting: OPHTHALMOLOGY

## 2019-12-16 ENCOUNTER — HOSPITAL ENCOUNTER (OUTPATIENT)
Facility: CLINIC | Age: 59
Discharge: HOME | End: 2019-12-16
Attending: OPHTHALMOLOGY | Admitting: OPHTHALMOLOGY
Payer: COMMERCIAL

## 2019-12-16 VITALS
HEART RATE: 60 BPM | RESPIRATION RATE: 18 BRPM | TEMPERATURE: 97.8 F | BODY MASS INDEX: 27.29 KG/M2 | OXYGEN SATURATION: 100 % | SYSTOLIC BLOOD PRESSURE: 147 MMHG | HEIGHT: 58 IN | DIASTOLIC BLOOD PRESSURE: 89 MMHG | WEIGHT: 130 LBS

## 2019-12-16 PROCEDURE — 25800030 ZZH RX IP 258 OP 636: Performed by: NURSE ANESTHETIST, CERTIFIED REGISTERED

## 2019-12-16 PROCEDURE — V2787 ASTIGMATISM-CORRECT FUNCTION: HCPCS | Performed by: OPHTHALMOLOGY

## 2019-12-16 PROCEDURE — 25000128 H RX IP 250 OP 636: Performed by: OPHTHALMOLOGY

## 2019-12-16 PROCEDURE — 25000125 ZZHC RX 250: Performed by: OPHTHALMOLOGY

## 2019-12-16 PROCEDURE — V2788 PRESBYOPIA-CORRECT FUNCTION: HCPCS

## 2019-12-16 PROCEDURE — 71000022 ZZH RECOVERY CATRACT PACKAGE: Performed by: OPHTHALMOLOGY

## 2019-12-16 PROCEDURE — 25000128 H RX IP 250 OP 636: Performed by: NURSE ANESTHETIST, CERTIFIED REGISTERED

## 2019-12-16 PROCEDURE — 36000025 ZZH CATARACT SURGICAL PACKAGE: Performed by: OPHTHALMOLOGY

## 2019-12-16 PROCEDURE — V2632 POST CHMBR INTRAOCULAR LENS: HCPCS | Performed by: OPHTHALMOLOGY

## 2019-12-16 PROCEDURE — 37000012 ZZH ANESTHESIA CATARACT PACKAGE: Performed by: OPHTHALMOLOGY

## 2019-12-16 PROCEDURE — 25000125 ZZHC RX 250: Performed by: NURSE ANESTHETIST, CERTIFIED REGISTERED

## 2019-12-16 PROCEDURE — 27210794 ZZH OR GENERAL SUPPLY STERILE: Performed by: OPHTHALMOLOGY

## 2019-12-16 DEVICE — IMPLANTABLE DEVICE: Type: IMPLANTABLE DEVICE | Site: EYE | Status: FUNCTIONAL

## 2019-12-16 RX ORDER — SODIUM CHLORIDE, SODIUM LACTATE, POTASSIUM CHLORIDE, CALCIUM CHLORIDE 600; 310; 30; 20 MG/100ML; MG/100ML; MG/100ML; MG/100ML
INJECTION, SOLUTION INTRAVENOUS CONTINUOUS
Status: CANCELLED | OUTPATIENT
Start: 2019-12-16

## 2019-12-16 RX ORDER — DEXAMETHASONE SODIUM PHOSPHATE 4 MG/ML
4 INJECTION, SOLUTION INTRA-ARTICULAR; INTRALESIONAL; INTRAMUSCULAR; INTRAVENOUS; SOFT TISSUE EVERY 10 MIN PRN
Status: CANCELLED | OUTPATIENT
Start: 2019-12-16

## 2019-12-16 RX ORDER — CYCLOPENTOLATE HYDROCHLORIDE 10 MG/ML
1 SOLUTION/ DROPS OPHTHALMIC
Status: COMPLETED | OUTPATIENT
Start: 2019-12-16 | End: 2019-12-16

## 2019-12-16 RX ORDER — ONDANSETRON 2 MG/ML
4 INJECTION INTRAMUSCULAR; INTRAVENOUS EVERY 30 MIN PRN
Status: CANCELLED | OUTPATIENT
Start: 2019-12-16

## 2019-12-16 RX ORDER — LIDOCAINE HYDROCHLORIDE 20 MG/ML
JELLY TOPICAL PRN
Status: DISCONTINUED | OUTPATIENT
Start: 2019-12-16 | End: 2019-12-16 | Stop reason: HOSPADM

## 2019-12-16 RX ORDER — NALOXONE HYDROCHLORIDE 0.4 MG/ML
.1-.4 INJECTION, SOLUTION INTRAMUSCULAR; INTRAVENOUS; SUBCUTANEOUS
Status: CANCELLED | OUTPATIENT
Start: 2019-12-16 | End: 2019-12-17

## 2019-12-16 RX ORDER — PROPARACAINE HYDROCHLORIDE 5 MG/ML
SOLUTION/ DROPS OPHTHALMIC PRN
Status: DISCONTINUED | OUTPATIENT
Start: 2019-12-16 | End: 2019-12-16 | Stop reason: HOSPADM

## 2019-12-16 RX ORDER — SODIUM CHLORIDE, SODIUM LACTATE, POTASSIUM CHLORIDE, CALCIUM CHLORIDE 600; 310; 30; 20 MG/100ML; MG/100ML; MG/100ML; MG/100ML
INJECTION, SOLUTION INTRAVENOUS CONTINUOUS
Status: DISCONTINUED | OUTPATIENT
Start: 2019-12-16 | End: 2019-12-16 | Stop reason: HOSPADM

## 2019-12-16 RX ORDER — PHENYLEPHRINE HYDROCHLORIDE 25 MG/ML
1 SOLUTION/ DROPS OPHTHALMIC
Status: COMPLETED | OUTPATIENT
Start: 2019-12-16 | End: 2019-12-16

## 2019-12-16 RX ORDER — LIDOCAINE 40 MG/G
CREAM TOPICAL
Status: DISCONTINUED | OUTPATIENT
Start: 2019-12-16 | End: 2019-12-16 | Stop reason: HOSPADM

## 2019-12-16 RX ORDER — BALANCED SALT SOLUTION 6.4; .75; .48; .3; 3.9; 1.7 MG/ML; MG/ML; MG/ML; MG/ML; MG/ML; MG/ML
SOLUTION OPHTHALMIC PRN
Status: DISCONTINUED | OUTPATIENT
Start: 2019-12-16 | End: 2019-12-16 | Stop reason: HOSPADM

## 2019-12-16 RX ORDER — ONDANSETRON 4 MG/1
4 TABLET, ORALLY DISINTEGRATING ORAL EVERY 30 MIN PRN
Status: CANCELLED | OUTPATIENT
Start: 2019-12-16

## 2019-12-16 RX ORDER — ALBUTEROL SULFATE 0.83 MG/ML
2.5 SOLUTION RESPIRATORY (INHALATION) EVERY 4 HOURS PRN
Status: CANCELLED | OUTPATIENT
Start: 2019-12-16

## 2019-12-16 RX ORDER — TROPICAMIDE 10 MG/ML
1 SOLUTION/ DROPS OPHTHALMIC
Status: COMPLETED | OUTPATIENT
Start: 2019-12-16 | End: 2019-12-16

## 2019-12-16 RX ADMIN — PHENYLEPHRINE HYDROCHLORIDE 1 DROP: 25 SOLUTION/ DROPS OPHTHALMIC at 08:04

## 2019-12-16 RX ADMIN — SODIUM CHLORIDE, POTASSIUM CHLORIDE, SODIUM LACTATE AND CALCIUM CHLORIDE: 600; 310; 30; 20 INJECTION, SOLUTION INTRAVENOUS at 08:10

## 2019-12-16 RX ADMIN — TROPICAMIDE 1 DROP: 10 SOLUTION/ DROPS OPHTHALMIC at 08:16

## 2019-12-16 RX ADMIN — CYCLOPENTOLATE HYDROCHLORIDE 1 DROP: 10 SOLUTION/ DROPS OPHTHALMIC at 08:03

## 2019-12-16 RX ADMIN — CYCLOPENTOLATE HYDROCHLORIDE 1 DROP: 10 SOLUTION/ DROPS OPHTHALMIC at 08:16

## 2019-12-16 RX ADMIN — MIDAZOLAM 2 MG: 1 INJECTION INTRAMUSCULAR; INTRAVENOUS at 09:29

## 2019-12-16 RX ADMIN — PHENYLEPHRINE HYDROCHLORIDE 1 DROP: 25 SOLUTION/ DROPS OPHTHALMIC at 08:10

## 2019-12-16 RX ADMIN — TROPICAMIDE 1 DROP: 10 SOLUTION/ DROPS OPHTHALMIC at 08:03

## 2019-12-16 RX ADMIN — LIDOCAINE HYDROCHLORIDE 0.1 ML: 10 INJECTION, SOLUTION EPIDURAL; INFILTRATION; INTRACAUDAL; PERINEURAL at 08:09

## 2019-12-16 RX ADMIN — CYCLOPENTOLATE HYDROCHLORIDE 1 DROP: 10 SOLUTION/ DROPS OPHTHALMIC at 08:12

## 2019-12-16 RX ADMIN — PHENYLEPHRINE HYDROCHLORIDE 1 DROP: 25 SOLUTION/ DROPS OPHTHALMIC at 08:15

## 2019-12-16 RX ADMIN — TROPICAMIDE 1 DROP: 10 SOLUTION/ DROPS OPHTHALMIC at 08:11

## 2019-12-16 ASSESSMENT — MIFFLIN-ST. JEOR: SCORE: 1059.43

## 2019-12-16 NOTE — OP NOTE
OPHTHALMOLOGY OPERATIVE NOTE    PATIENT: Flor Seth  DATE OF SURGERY: 12/16/2019  PREOPERATIVE DIAGNOSIS:  Senile Nuclear Cataract, Right eye  POSTOPERATIVE DIAGNOSIS:  Senile Nuclear Cataract, Right eye  OPERATIVE PROCEDURE:  Phacoemulsification with placement of advanced toric multifocal intraocular lens  SURGEON:  Carmelo Lock MD  ANESTHESIA:  Topical / MAC  EBL:  None  SPECIMENS:  None  COMPLICATIONS:  None    PROCEDURE:  The patient was brought to the operating room at [facility].  The eye was anesthetized with topical anesthetic, and a sterile marking pen was used to raj the limbus at the 0 and 180 axis.  The right eye was prepped and draped in the usual fashion for cataract surgery.  A wire lid speculum was inserted.  A super sharp blade was used to make a paracentesis at the 11 O'clock position.  The super sharp blade was used to make a partial thickness temporal groove, which was 3 mm in length.  0.8 mL of non-preserved epi-Shugarcaine was injected into the anterior chamber.  Viscoelastic was used to inflate the anterior chamber through a cannula.  A 2.5 mm microkeratome was used to make a temporal clear corneal incision in a two-plane fashion.  A cystotome needle and forceps were used to make a capsulorrhexis.  Hydrodissection and hydrodelineation were performed with Balance Salt Solution.  The lens was then phacoemulsified and removed without complications.  The cortical material was removed with bimanual irrigation and aspiration.  The capsular bag was filled with viscoelastic.  A posterior chamber intraocular lens, preselected and recorded, was folded and inserted into the capsular bag.  The lens was then rotated to the correct axis according to the preoperative data, using the limbal markings and an axial gauge.  The viscoelastic was removed with the irrigation and aspiration tip.  Balanced Salt Solution with Vigamox, 150mg/0.1mL, was used to refill the anterior  chamber, and make final lens axis adjustments.  The wounds were checked for water tightness and required no suture.  The wire lid speculum was removed.  The patient's right eye was cleaned and a drop of each post-operative drop was placed, followed by a auguste shield.  The patient tolerated the procedure well, and there were no complications.      Carmelo Lock MD

## 2019-12-16 NOTE — ANESTHESIA CARE TRANSFER NOTE
Patient: Flor Joneser    Procedure(s):  Cataract removal with implant. (Symfony lens)    Diagnosis: Cataract [H26.9]  Diagnosis Additional Information: No value filed.    Anesthesia Type:   MAC     Note:  Airway :Room Air  Patient transferred to:Phase II  Handoff Report: Identifed the Patient, Identified the Reponsible Provider, Reviewed the pertinent medical history, Discussed the surgical course, Reviewed Intra-OP anesthesia mangement and issues during anesthesia, Set expectations for post-procedure period and Allowed opportunity for questions and acknowledgement of understanding      Vitals: (Last set prior to Anesthesia Care Transfer)    CRNA VITALS  12/16/2019 0921 - 12/16/2019 0951      12/16/2019             NIBP:  121/62    Pulse:  64    NIBP Mean:  84    SpO2:  99 %    EKG:  NSR                Electronically Signed By: JUN Caraballo CRNA  December 16, 2019  9:51 AM

## 2019-12-16 NOTE — ANESTHESIA POSTPROCEDURE EVALUATION
Patient: Flor Seth    Procedure(s):  Cataract removal with implant. (Symfony lens)    Diagnosis:Cataract [H26.9]  Diagnosis Additional Information: No value filed.    Anesthesia Type:  MAC    Note:  Anesthesia Post Evaluation    Patient location during evaluation: Phase 2  Patient participation: Able to fully participate in evaluation  Level of consciousness: awake and alert  Pain management: adequate  Airway patency: patent  Cardiovascular status: acceptable and hemodynamically stable  Respiratory status: acceptable, room air and spontaneous ventilation  Hydration status: acceptable  PONV: none     Anesthetic complications: None          Last vitals:  Vitals:    12/16/19 0746   BP: (!) 150/92   Pulse: 76   Resp: 18   Temp: 36.6  C (97.8  F)   SpO2: 100%         Electronically Signed By: JUN Caraballo CRNA  December 16, 2019  9:32 AM

## 2019-12-16 NOTE — DISCHARGE INSTRUCTIONS
"                    Same Day Surgery Discharge Instructions  Special Precautions After Surgery - Adult    1. It is not unusual to feel lightheaded or faint, up to 24 hours after surgery or while taking pain medication.  If you have these symptoms; sit for a few minutes before standing and have someone assist you when getting up.  2. You should rest and relax for the next 24 hours and must have someone stay with you for at least 24 hours after your discharge.  3. DO NOT DRIVE any vehicle or operate mechanical equipment for 24 hours following the end of your surgery.  DO NOT DRIVE while taking narcotic pain medications that have been prescribed by your physician.  If you had a limb operated on, you must be able to use it fully to drive.  4. DO NOT drink alcoholic beverages for 24 hours following surgery or while taking prescription pain medication.  5. Drink clear liquids (apple juice, ginger ale, broth, 7-Up, etc.).  Progress to your regular diet as you feel able.  6. Any questions call your physician and do not make important decisions for 24 hours.    ACTIVITY  ? { :1818679}     INCISIONAL CARE  ? { :9177433}     Call for an appointment to return to the clinic { :9612839}    Medications:  ? { :4576141::\"Follow the instructions on the bottle.\"}     Additional discharge instructions: { :1381154::\"None\"}  __________________________________________________________________________________________________________________________________  IMPORTANT NUMBERS:    [facility] Main Number:  [phone], [phone]  Pharmacy:  [phone]  Same Day Surgery:  [phone], Monday - Friday until 8:30 p.m.  Urgent Care:  [phone]  Emergency Room:  [phone]      [facility]:  [phone]                                                                             [facility]:  [phone] option 1  [facility]:  [phone]     [facility] Clinic:  [phone]   [facility]:  " [phone]   Home Medical Equipment: [phone]  [facility]:  [phone]

## 2020-03-06 ENCOUNTER — TELEPHONE (OUTPATIENT)
Dept: FAMILY MEDICINE | Facility: CLINIC | Age: 60
End: 2020-03-06

## 2020-03-06 NOTE — TELEPHONE ENCOUNTER
Panel Management Review          Composite cancer screening  Chart review shows that this patient is due/due soon for the following Mammogram and Colonoscopy  Summary:    Patient is due/failing the following:   COLONOSCOPY and MAMMOGRAM    Action needed:   Patient needs referral/order: colonoscopy vs fit and schedule mammogram    Type of outreach:    Sent letter.    Questions for provider review:    None                                                                                                                                    TRINH LONGORIA

## 2020-03-06 NOTE — LETTER
[facility]  [address] [address] [phone]    March 6, 2020    Flor Seth  [address] [address]          Dear Flor,    --Mammogram screening is generally recommended every year starting at age of 50.  Some women may choose to be screened at an earlier age ( between 40 & 50) depending on risk factors and discussions with her primary provider.   Call [phone] to schedule a mammogram  --Colon cancer screening is generally recommended in all patients over the age of 50 years of age. This can be with either colonoscopy every 10 years, or testing the stool for blood one time per year (called a FIT test, a simple card you can send back to us in the mail). On review of our electronic medical record it appears you are due for colon cancer screening. Please call the clinic to assist in setting up a colonoscopy or, if you prefer, setting up the test for stool blood(FIT).    If you have had any of these tests at an outside facility, please let us know so that we can update your record.     Please disregard this notice if you have already scheduled an appointment.     Sincerely,    Xochilt TANNER  [facility] - [phone]  [email or web address]

## 2020-03-15 DIAGNOSIS — F41.9 ANXIETY: ICD-10-CM

## 2020-03-16 NOTE — TELEPHONE ENCOUNTER
"Requested Prescriptions   Pending Prescriptions Disp Refills     hydrOXYzine (ATARAX) 25 MG tablet [Pharmacy Med Name: hydrOXYzine HCL 25MG TAB] 60 tablet 1     Sig: TAKE ONE TABLET BY MOUTH EVERY 8 HOURS AS NEEDED FOR ANXIETY       Antihistamines Protocol Passed - 3/15/2020  5:23 PM        Passed - Recent (12 mo) or future (30 days) visit within the authorizing provider's specialty     Patient has had an office visit with the authorizing provider or a provider within the authorizing providers department within the previous 12 mos or has a future within next 30 days. See \"Patient Info\" tab in inbasket, or \"Choose Columns\" in Meds & Orders section of the refill encounter.              Passed - Patient is age 3 or older     Apply age and/or weight-based dosing for peds patients age 3 and older.    Forward request to provider for patients under the age of 3.          Passed - Medication is active on med list           Last Written Prescription Date: 7/24/2019  Last Fill Quantity: 60,  # refills: 1   Last office visit: 10/24/2019 with prescribing provider:  Jia   Future Office Visit:      "

## 2020-03-17 RX ORDER — HYDROXYZINE HYDROCHLORIDE 25 MG/1
TABLET, FILM COATED ORAL
Qty: 60 TABLET | Refills: 1 | Status: SHIPPED | OUTPATIENT
Start: 2020-03-17 | End: 2020-11-06

## 2020-05-15 ENCOUNTER — TELEPHONE (OUTPATIENT)
Dept: FAMILY MEDICINE | Facility: CLINIC | Age: 60
End: 2020-05-15

## 2020-05-15 DIAGNOSIS — I10 BENIGN ESSENTIAL HYPERTENSION: ICD-10-CM

## 2020-05-18 RX ORDER — LISINOPRIL 40 MG/1
TABLET ORAL
Qty: 90 TABLET | Refills: 3 | OUTPATIENT
Start: 2020-05-18

## 2020-05-22 NOTE — TELEPHONE ENCOUNTER
Pt has called back and stating she cannot find any refill for this med at home, Pt  Has about 5 - 7 days left.  Phar is [facility].    Karen Thompson  CSS Float

## 2020-05-22 NOTE — TELEPHONE ENCOUNTER
CSS: Patient needs to contact pharmacy as this was sent with refills for the year in October.     No answer, could not leave message.      SERA ApodacaN, RN

## 2020-09-08 ENCOUNTER — ANCILLARY PROCEDURE (OUTPATIENT)
Dept: GENERAL RADIOLOGY | Facility: CLINIC | Age: 60
End: 2020-09-08
Attending: NURSE PRACTITIONER
Payer: COMMERCIAL

## 2020-09-08 ENCOUNTER — OFFICE VISIT (OUTPATIENT)
Dept: FAMILY MEDICINE | Facility: CLINIC | Age: 60
End: 2020-09-08
Payer: COMMERCIAL

## 2020-09-08 VITALS
OXYGEN SATURATION: 100 % | BODY MASS INDEX: 26.41 KG/M2 | DIASTOLIC BLOOD PRESSURE: 86 MMHG | HEART RATE: 72 BPM | TEMPERATURE: 97.6 F | SYSTOLIC BLOOD PRESSURE: 130 MMHG | RESPIRATION RATE: 16 BRPM | WEIGHT: 131 LBS | HEIGHT: 59 IN

## 2020-09-08 DIAGNOSIS — M79.671 PAIN OF RIGHT HEEL: ICD-10-CM

## 2020-09-08 DIAGNOSIS — M79.671 RIGHT FOOT PAIN: Primary | ICD-10-CM

## 2020-09-08 DIAGNOSIS — M79.671 RIGHT FOOT PAIN: ICD-10-CM

## 2020-09-08 PROCEDURE — 99213 OFFICE O/P EST LOW 20 MIN: CPT | Performed by: NURSE PRACTITIONER

## 2020-09-08 PROCEDURE — 73650 X-RAY EXAM OF HEEL: CPT | Mod: RT

## 2020-09-08 ASSESSMENT — MIFFLIN-ST. JEOR: SCORE: 1074.84

## 2020-09-08 NOTE — PATIENT INSTRUCTIONS
Thank you for choosing [facility].  You may be receiving an email and/or telephone survey request from [facility] Customer Experience regarding your visit today.  Please take a few minutes to respond to the survey to let us know how we are doing.      If you have questions or concerns, please contact us via [employer] or you can contact your care team at [phone].    Our Clinic hours are:  Monday 6:40 am  to 7:00 pm  Tuesday -Friday 6:40 am to 5:00 pm    The [place] outpatient lab hours are:  Monday - Friday 6:10 am to 4:45 pm  Saturdays 7:00 am to 11:00 am  Appointments are required, call [phone]    If you have clinical questions after hours or would like to schedule an appointment,  call the clinic at [phone].

## 2020-09-08 NOTE — PROGRESS NOTES
Subjective     Flor Seth is a 60 year old female who presents to clinic today for the following health issues:    HPI       Musculoskeletal problem/pain  Onset/Duration: 2 weeks ago - started 3 yrs ago but went away until 2 weeks ago.   Description  Location:  foot  - right  Joint Swelling: no  Redness: no  Pain: YES  Warmth: no  Intensity:  7-8/10  Progression of Symptoms:  worsening  Accompanying signs and symptoms:   Fevers: no  Numbness/tingling/weakness:shoot pain   History  Trauma to the area: no  Recent illness:  no  Previous similar problem: YES  Previous evaluation:  no  Precipitating or alleviating factors:  Aggravating factors include: standing, walking and climbing stairs  Therapies tried and outcome: nothing    {Review of Systems   Constitutional, HEENT, cardiovascular, pulmonary, GI, , musculoskeletal, neuro, skin, endocrine and psych systems are negative, except as otherwise noted.      Objective    There were no vitals taken for this visit.  There is no height or weight on file to calculate BMI.  Physical Exam   GENERAL APPEARANCE: healthy, alert and no distress  ORTHO: Foot Exam: Inspection:  no swelling Tender::calcaneous   Non-tender:entire rest of foot  Range of Motion:Normal              Assessment & Plan     Right foot pain  ? Heel spur  Discussed Tylenol/ice prn comfort   - Orthopedic & Spine  Referral; Future    Pain of right heel  ? Heel spur  - Orthopedic [facility] Spine  Referral; Future             No follow-ups on file.    JUN Dominguez [facility]

## 2020-09-10 ENCOUNTER — OFFICE VISIT (OUTPATIENT)
Dept: PODIATRY | Facility: CLINIC | Age: 60
End: 2020-09-10
Payer: COMMERCIAL

## 2020-09-10 VITALS
BODY MASS INDEX: 26.41 KG/M2 | WEIGHT: 131 LBS | HEIGHT: 59 IN | DIASTOLIC BLOOD PRESSURE: 85 MMHG | SYSTOLIC BLOOD PRESSURE: 143 MMHG | HEART RATE: 79 BPM

## 2020-09-10 DIAGNOSIS — M72.2 PLANTAR FASCIITIS, RIGHT: Primary | ICD-10-CM

## 2020-09-10 PROCEDURE — 99203 OFFICE O/P NEW LOW 30 MIN: CPT | Performed by: PODIATRIST

## 2020-09-10 ASSESSMENT — MIFFLIN-ST. JEOR: SCORE: 1074.84

## 2020-09-10 ASSESSMENT — PAIN SCALES - GENERAL: PAINLEVEL: SEVERE PAIN (7)

## 2020-09-10 NOTE — PROGRESS NOTES
PATIENT HISTORY:  Flor Seth is a 60 year old female who presents to clinic for a painful right foot .  The patient describes the pain as sharp stabbing.  The patient relates the pain level is moderate.  The patient relates pain is located under the heel on the right foot.  The patient relates the pain has been present for the past several weeks.  The patient relates pain with ambulation.  The patient has tried different shoes with little relief.         REVIEW OF SYSTEMS:  Constitutional, HEENT, cardiovascular, pulmonary, GI, , musculoskeletal, neuro, skin, endocrine and psych systems are negative, except as otherwise noted.     PAST MEDICAL HISTORY:   Past Medical History:   Diagnosis Date     Anemia      Hypertension      Iron deficiency anemia 2005     menorrhagia      Menorrhagia 2011    S/p ablation         PAST SURGICAL HISTORY:   Past Surgical History:   Procedure Laterality Date      SECTION      twins     DILATION AND CURETTAGE, HYSTEROSCOPY, ABLATE ENDOMETRIUM NOVASURE, COMBINED  2011    D&C HSC ablation     PHACOEMULSIFICATION CLEAR CORNEA WITH TORIC INTRAOCULAR LENS IMPLANT Right 2019    Procedure: Cataract removal with implant. (Symfony lens);  Surgeon: Carmelo Lock MD;  Location: [facility]     PHACOEMULSIFICATION CLEAR CORNEA WITH TORIC INTRAOCULAR LENS IMPLANT Left 2019    Procedure: Cataract removal with implant. (Symfony lens);  Surgeon: Carmelo Lock MD;  Location: [facility]     SURGICAL HISTORY OF -   , ,     Vaginal delivery        MEDICATIONS:   Current Outpatient Medications:      ASPIRIN 81 MG OR TABS, 1 TABLET BY MOUTH DAILY, Disp: , Rfl:      hydrOXYzine (ATARAX) 25 MG tablet, TAKE ONE TABLET BY MOUTH EVERY 8 HOURS AS NEEDED FOR ANXIETY, Disp: 60 tablet, Rfl: 1     lisinopril (ZESTRIL) 40 MG tablet, Take 1 tablet (40 mg) by mouth daily, Disp: 90 tablet, Rfl: 1     Multiple Vitamin (MULTI-VITAMIN) per tablet, Take 1 tablet by  mouth daily., Disp: , Rfl:      OMEGA 3 OR, Once daily, Disp: , Rfl:      pravastatin (PRAVACHOL) 20 MG tablet, Take 1 tablet (20 mg) by mouth daily, Disp: 90 tablet, Rfl: 3     ALLERGIES:    Allergies   Allergen Reactions     Vicodin [Hydrocodone-Acetaminophen] Nausea and Vomiting        SOCIAL HISTORY:   Social History     Socioeconomic History     Marital status:      Spouse name: Not on file     Number of children: Not on file     Years of education: Not on file     Highest education level: Not on file   Occupational History     Not on file   Social Needs     Financial resource strain: Not on file     Food insecurity     Worry: Not on file     Inability: Not on file     Transportation needs     Medical: Not on file     Non-medical: Not on file   Tobacco Use     Smoking status: Never Smoker     Smokeless tobacco: Never Used   Substance and Sexual Activity     Alcohol use: No     Drug use: No     Sexual activity: Yes     Partners: Male     Birth control/protection: Condom   Lifestyle     Physical activity     Days per week: Not on file     Minutes per session: Not on file     Stress: Not on file   Relationships     Social connections     Talks on phone: Not on file     Gets together: Not on file     Attends [employer] service: Not on file     Active member of club or organization: Not on file     Attends meetings of clubs or organizations: Not on file     Relationship status: Not on file     Intimate partner violence     Fear of current or ex partner: Not on file     Emotionally abused: Not on file     Physically abused: Not on file     Forced sexual activity: Not on file   Other Topics Concern     Parent/sibling w/ CABG, MI or angioplasty before 65F 55M? Yes   Social History Narrative     Not on file        FAMILY HISTORY:   Family History   Problem Relation Age of Onset     C.A.D. Mother      Hypertension Other      Cancer No family hx of         EXAM:Vitals: BP (!) 143/85   Pulse 79   Ht 1.499 m (4'  "11\")   Wt 59.4 kg (131 lb)   BMI 26.46 kg/m    BMI= Body mass index is 26.46 kg/m .    Weight management plan: Patient was referred to their PCP to discuss a diet and exercise plan.    General appearance: Patient is alert and fully cooperative with history & exam.  No sign of distress is noted during the visit.     Psychiatric: Affect is pleasant & appropriate.  Patient appears motivated to improve health.     Respiratory: Breathing is regular & unlabored while sitting.     HEENT: Hearing is intact to spoken word.  Speech is clear.  No gross evidence of visual impairment that would impact ambulation.     Dermatologic: Skin is intact to right lower extremities without significant lesions, rash or abrasion.  No paronychia or evidence of soft tissue infection is noted.     Vascular: DP & PT pulses are intact & regular on the right.  No significant edema or varicosities noted.  CFT and skin temperature is normal to the right lower extremities.     Neurologic: Lower extremity sensation is intact to light touch.  No evidence of weakness or contracture in the right lower extremities.  No evidence of neuropathy.     Musculoskeletal: Patient is ambulatory without assistive device or brace.  No gross ankle deformity noted.  No foot or ankle joint effusion is noted.  Noted pain on palpation on the plantar aspect of the right heel at the insertion point of the plantar fascia.  No surrounding erythema noted.  One notes a tight gastroc complex on the right lower extremity.    Recent radiographs were evaluated including calcaneal axial and lateral views of the right foot reveals  no cortical erosions or periosteal elevation.  All joint margins appear stable.  There is no apparent fracture or tumor formation noted.  There is no evidence of foreign body.    ASSESSMENT / PLAN:     ICD-10-CM    1. Plantar fasciitis, right  M72.2        I have explained to Flor  about the conditions.  We discussed the nature of the condition as " well as the treatment plan and expected length of recovery.  At this time, the patient was instructed on icing, stretching, tissue massage and support.  The patient was fitted with a Dynaflex insert that will aid in offloading the tension forces to the soft tissues and prevent further inflammation.   The patient will return in four weeks for reevaluation if the symptoms do not resolve.      Flor verbalized agreement with and understanding of the rational for the diagnosis and treatment plan.  All questions were answered to best of my ability and the patient's satisfaction. The patient was advised to contact the clinic with any questions that may arise after the clinic visit.      Disclaimer: This note consists of symbols derived from keyboarding, dictation and/or voice recognition software. As a result, there may be errors in the script that have gone undetected. Please consider this when interpreting information found in this chart.       AZUL Levine D.P.M., F.BON.TRUMAN.F.A.S.

## 2020-09-10 NOTE — LETTER
9/10/2020         RE: Flor Seth  [address] [address]        Dear Colleague,    Thank you for referring your patient, Flor Seth, to the [facility]. Please see a copy of my visit note below.    PATIENT HISTORY:  Flor Seth is a 60 year old female who presents to clinic for a painful right foot .  The patient describes the pain as sharp stabbing.  The patient relates the pain level is moderate.  The patient relates pain is located under the heel on the right foot.  The patient relates the pain has been present for the past several weeks.  The patient relates pain with ambulation.  The patient has tried different shoes with little relief.         REVIEW OF SYSTEMS:  Constitutional, HEENT, cardiovascular, pulmonary, GI, , musculoskeletal, neuro, skin, endocrine and psych systems are negative, except as otherwise noted.     PAST MEDICAL HISTORY:   Past Medical History:   Diagnosis Date     Anemia      Hypertension      Iron deficiency anemia 2005     menorrhagia      Menorrhagia 2011    S/p ablation         PAST SURGICAL HISTORY:   Past Surgical History:   Procedure Laterality Date      SECTION      twins     DILATION AND CURETTAGE, HYSTEROSCOPY, ABLATE ENDOMETRIUM NOVASURE, COMBINED  2011    D&C HSC ablation     PHACOEMULSIFICATION CLEAR CORNEA WITH TORIC INTRAOCULAR LENS IMPLANT Right 2019    Procedure: Cataract removal with implant. (Symfony lens);  Surgeon: Carmelo Lock MD;  Location: [facility]     PHACOEMULSIFICATION CLEAR CORNEA WITH TORIC INTRAOCULAR LENS IMPLANT Left 2019    Procedure: Cataract removal with implant. (Symfony lens);  Surgeon: Carmelo Lock MD;  Location: [facility]     SURGICAL HISTORY OF -   , ,     Vaginal delivery        MEDICATIONS:   Current Outpatient Medications:      ASPIRIN 81 MG OR TABS, 1 TABLET BY MOUTH DAILY, Disp: , Rfl:      hydrOXYzine (ATARAX) 25 MG  tablet, TAKE ONE TABLET BY MOUTH EVERY 8 HOURS AS NEEDED FOR ANXIETY, Disp: 60 tablet, Rfl: 1     lisinopril (ZESTRIL) 40 MG tablet, Take 1 tablet (40 mg) by mouth daily, Disp: 90 tablet, Rfl: 1     Multiple Vitamin (MULTI-VITAMIN) per tablet, Take 1 tablet by mouth daily., Disp: , Rfl:      OMEGA 3 OR, Once daily, Disp: , Rfl:      pravastatin (PRAVACHOL) 20 MG tablet, Take 1 tablet (20 mg) by mouth daily, Disp: 90 tablet, Rfl: 3     ALLERGIES:    Allergies   Allergen Reactions     Vicodin [Hydrocodone-Acetaminophen] Nausea and Vomiting        SOCIAL HISTORY:   Social History     Socioeconomic History     Marital status:      Spouse name: Not on file     Number of children: Not on file     Years of education: Not on file     Highest education level: Not on file   Occupational History     Not on file   Social Needs     Financial resource strain: Not on file     Food insecurity     Worry: Not on file     Inability: Not on file     Transportation needs     Medical: Not on file     Non-medical: Not on file   Tobacco Use     Smoking status: Never Smoker     Smokeless tobacco: Never Used   Substance and Sexual Activity     Alcohol use: No     Drug use: No     Sexual activity: Yes     Partners: Male     Birth control/protection: Condom   Lifestyle     Physical activity     Days per week: Not on file     Minutes per session: Not on file     Stress: Not on file   Relationships     Social connections     Talks on phone: Not on file     Gets together: Not on file     Attends Quaker service: Not on file     Active member of club or organization: Not on file     Attends meetings of clubs or organizations: Not on file     Relationship status: Not on file     Intimate partner violence     Fear of current or ex partner: Not on file     Emotionally abused: Not on file     Physically abused: Not on file     Forced sexual activity: Not on file   Other Topics Concern     Parent/sibling w/ CABG, MI or angioplasty before 65F  "55M? Yes   Social History Narrative     Not on file        FAMILY HISTORY:   Family History   Problem Relation Age of Onset     C.A.D. Mother      Hypertension Other      Cancer No family hx of         EXAM:Vitals: BP (!) 143/85   Pulse 79   Ht 1.499 m (4' 11\")   Wt 59.4 kg (131 lb)   BMI 26.46 kg/m    BMI= Body mass index is 26.46 kg/m .    Weight management plan: Patient was referred to their PCP to discuss a diet and exercise plan.    General appearance: Patient is alert and fully cooperative with history & exam.  No sign of distress is noted during the visit.     Psychiatric: Affect is pleasant & appropriate.  Patient appears motivated to improve health.     Respiratory: Breathing is regular & unlabored while sitting.     HEENT: Hearing is intact to spoken word.  Speech is clear.  No gross evidence of visual impairment that would impact ambulation.     Dermatologic: Skin is intact to right lower extremities without significant lesions, rash or abrasion.  No paronychia or evidence of soft tissue infection is noted.     Vascular: DP & PT pulses are intact & regular on the right.  No significant edema or varicosities noted.  CFT and skin temperature is normal to the right lower extremities.     Neurologic: Lower extremity sensation is intact to light touch.  No evidence of weakness or contracture in the right lower extremities.  No evidence of neuropathy.     Musculoskeletal: Patient is ambulatory without assistive device or brace.  No gross ankle deformity noted.  No foot or ankle joint effusion is noted.  Noted pain on palpation on the plantar aspect of the right heel at the insertion point of the plantar fascia.  No surrounding erythema noted.  One notes a tight gastroc complex on the right lower extremity.    Recent radiographs were evaluated including calcaneal axial and lateral views of the right foot reveals  no cortical erosions or periosteal elevation.  All joint margins appear stable.  There is no " apparent fracture or tumor formation noted.  There is no evidence of foreign body.    ASSESSMENT / PLAN:     ICD-10-CM    1. Plantar fasciitis, right  M72.2        I have explained to Flor  about the conditions.  We discussed the nature of the condition as well as the treatment plan and expected length of recovery.  At this time, the patient was instructed on icing, stretching, tissue massage and support.  The patient was fitted with a Dynaflex insert that will aid in offloading the tension forces to the soft tissues and prevent further inflammation.   The patient will return in four weeks for reevaluation if the symptoms do not resolve.      Flor verbalized agreement with and understanding of the rational for the diagnosis and treatment plan.  All questions were answered to best of my ability and the patient's satisfaction. The patient was advised to contact the clinic with any questions that may arise after the clinic visit.      Disclaimer: This note consists of symbols derived from keyboarding, dictation and/or voice recognition software. As a result, there may be errors in the script that have gone undetected. Please consider this when interpreting information found in this chart.       FIDENCIO Hernández.PHERO., F.A.C.F.A.S.        Again, thank you for allowing me to participate in the care of your patient.        Sincerely,        Sher Levine DPM

## 2020-09-10 NOTE — NURSING NOTE
"Chief Complaint   Patient presents with     Consult     Right middle heel pain, X2 weeks on and off pain       Initial BP (!) 143/85   Pulse 79   Ht 1.499 m (4' 11\")   Wt 59.4 kg (131 lb)   BMI 26.46 kg/m   Estimated body mass index is 26.46 kg/m  as calculated from the following:    Height as of this encounter: 1.499 m (4' 11\").    Weight as of this encounter: 59.4 kg (131 lb).  Medications and allergies reviewed.      Rupa NICOLAS MA    "

## 2020-09-10 NOTE — PATIENT INSTRUCTIONS
Initial musculoskeletal treatment recommendation:    1.  Wear supportive foot wear (stiff soles) and/or arch supports (rigid not cushion).  2.  Stretch the calf muscles as instructed once an hour.  3.  Massage the soft tissues around the injured area in the morning to loosen the tissue with an over the counter product like Icy Hot with Lidocaine  4.  Ice the injured area in the evening; 20 min on/off.  5. Take antiinflammatory medication as indicated.    If no improvement in symptoms within four to six weeks, return to clinic for reevaluation.  Flor to follow up with Primary Care provider regarding elevated blood pressure.

## 2020-10-10 ENCOUNTER — IMMUNIZATION (OUTPATIENT)
Dept: FAMILY MEDICINE | Facility: CLINIC | Age: 60
End: 2020-10-10
Payer: COMMERCIAL

## 2020-10-10 PROCEDURE — 90682 RIV4 VACC RECOMBINANT DNA IM: CPT

## 2020-10-10 PROCEDURE — 90471 IMMUNIZATION ADMIN: CPT

## 2020-10-26 ENCOUNTER — TELEPHONE (OUTPATIENT)
Dept: FAMILY MEDICINE | Facility: CLINIC | Age: 60
End: 2020-10-26

## 2020-10-26 NOTE — LETTER
October 29, 2020      Flor Seth  [address] [address]        Dear Flor,     In order to ensure we are providing the best quality care, we have reviewed your chart and see that you are due for:  An office with for blood pressure and labs with your provider.    Please call the clinic at your earliest convenience to schedule an appointment.  [phone]    Thank you for trusting us with your health care.  Sincerely,    Your [facility] Care Team/lw

## 2020-10-27 NOTE — TELEPHONE ENCOUNTER
Patient Quality Outreach Summary      Summary:    Patient is due/failing the following:   BP check and labs with provider.    Type of outreach:    Call attempted, no answer and mailbox is full.    Questions for provider review:    None                                                                                                                    JORGE Burton MA       Chart routed to Care Team.

## 2020-11-05 NOTE — PROGRESS NOTES
"Subjective     Florlalita Seth is a 60 year old female who presents to clinic today for the following health issues:    HPI         Hyperlipidemia Follow-Up      Are you regularly taking any medication or supplement to lower your cholesterol?   Yes- pravastatin    Are you having muscle aches or other side effects that you think could be caused by your cholesterol lowering medication?  No    Hypertension Follow-up      Do you check your blood pressure regularly outside of the clinic? Yes     Are you following a low salt diet? No    Are your blood pressures ever more than 140 on the top number (systolic) OR more   than 90 on the bottom number (diastolic), for example 140/90? Yes   Patient mentions she is taking ibuprofen for her foot pain which raises her blood pressure- she has concerns about this  She would like to add a blood pressure medication to help control heart beats - increases when anxious. And she has been taking an extra lisinopril when she \"feels her BP rising\"      How many servings of fruits and vegetables do you eat daily?  2-3    On average, how many sweetened beverages do you drink each day (Examples: soda, juice, sweet tea, etc.  Do NOT count diet or artificially sweetened beverages)?   0-1    How many days per week do you exercise enough to make your heart beat faster? 3 or less-     How many minutes a day do you exercise enough to make your heart beat faster? 30 - 60    How many days per week do you miss taking your medication? 0      Wt Readings from Last 4 Encounters:   11/06/20 59 kg (130 lb)   09/10/20 59.4 kg (131 lb)   09/08/20 59.4 kg (131 lb)   12/16/19 59 kg (130 lb)         Anxiety Follow-Up    How are you doing with your anxiety since your last visit? stable    Are you having other symptoms that might be associated with anxiety? No    Have you had a significant life event? No     Are you feeling depressed? No    Do you have any concerns with your use of alcohol or other drugs? " "No    Social History     Tobacco Use     Smoking status: Never Smoker     Smokeless tobacco: Never Used   Substance Use Topics     Alcohol use: No     Drug use: No     No flowsheet data found.  No flowsheet data found.      Review of Systems   Constitutional, HEENT, cardiovascular, pulmonary, gi and gu systems are negative, except as otherwise noted.      Objective    BP (!) 156/82 (BP Location: Right arm)   Pulse 74   Temp 97.7  F (36.5  C) (Tympanic)   Ht 1.486 m (4' 10.5\")   Wt 59 kg (130 lb)   SpO2 98%   BMI 26.71 kg/m    Body mass index is 26.71 kg/m .  Physical Exam   GENERAL: healthy, alert and no distress  NECK: no adenopathy, no asymmetry, masses, or scars and thyroid normal to palpation  RESP: lungs clear to auscultation - no rales, rhonchi or wheezes  CV: regular rate and rhythm, normal S1 S2, no S3 or S4, no murmur, click or rub, no peripheral edema and peripheral pulses strong  ABDOMEN: soft, nontender, no hepatosplenomegaly, no masses and bowel sounds normal  MS: no gross musculoskeletal defects noted, no edema            Assessment & Plan     Benign essential hypertension  Not well controlled.  See AVS.  - lisinopril (ZESTRIL) 40 MG tablet; Take 1 tablet (40 mg) by mouth daily  - metoprolol succinate ER (TOPROL-XL) 25 MG 24 hr tablet; Take 1 tablet (25 mg) by mouth daily    Hyperlipidemia LDL goal <130  She will return for labs when fasitng.  - pravastatin (PRAVACHOL) 20 MG tablet; Take 1 tablet (20 mg) by mouth daily    Anxiety  - hydrOXYzine (ATARAX) 25 MG tablet; TAKE ONE TABLET BY MOUTH EVERY 8 HOURS AS NEEDED FOR ANXIETY    Screening for colon cancer  - Fecal colorectal cancer screen (FIT); Future    Encounter for screening mammogram for breast cancer  - MA Screen Bilateral w/Pato; Future        Patient Instructions   For blood pressure:  Continue lisinopril 40 mg daily.  Add metoprolol 25 mg daily.  Return for recheck in one month - may be the the RN if you prefer.      Schedule mammogram: " [phone]    Return FIT kit.      Return in about 4 weeks (around 12/4/2020) for BP Recheck.    The risks, benefits and treatment options of prescribed medications or other treatments have been discussed with the patient. The patient verbalized their understanding and should call or follow up if no improvement or if they develop further problems.    JUN [facility]

## 2020-11-06 ENCOUNTER — OFFICE VISIT (OUTPATIENT)
Dept: FAMILY MEDICINE | Facility: CLINIC | Age: 60
End: 2020-11-06
Payer: COMMERCIAL

## 2020-11-06 VITALS
HEIGHT: 59 IN | BODY MASS INDEX: 26.21 KG/M2 | OXYGEN SATURATION: 98 % | HEART RATE: 74 BPM | SYSTOLIC BLOOD PRESSURE: 150 MMHG | TEMPERATURE: 97.7 F | WEIGHT: 130 LBS | DIASTOLIC BLOOD PRESSURE: 90 MMHG

## 2020-11-06 DIAGNOSIS — Z12.31 ENCOUNTER FOR SCREENING MAMMOGRAM FOR BREAST CANCER: ICD-10-CM

## 2020-11-06 DIAGNOSIS — I10 BENIGN ESSENTIAL HYPERTENSION: Primary | ICD-10-CM

## 2020-11-06 DIAGNOSIS — F41.9 ANXIETY: ICD-10-CM

## 2020-11-06 DIAGNOSIS — Z12.11 SCREENING FOR COLON CANCER: ICD-10-CM

## 2020-11-06 DIAGNOSIS — E78.5 HYPERLIPIDEMIA LDL GOAL <130: ICD-10-CM

## 2020-11-06 PROCEDURE — 99214 OFFICE O/P EST MOD 30 MIN: CPT | Performed by: NURSE PRACTITIONER

## 2020-11-06 RX ORDER — METOPROLOL SUCCINATE 25 MG/1
25 TABLET, EXTENDED RELEASE ORAL DAILY
Qty: 90 TABLET | Refills: 3 | Status: SHIPPED | OUTPATIENT
Start: 2020-11-06 | End: 2021-02-01 | Stop reason: DRUGHIGH

## 2020-11-06 RX ORDER — PRAVASTATIN SODIUM 20 MG
20 TABLET ORAL DAILY
Qty: 90 TABLET | Refills: 3 | Status: SHIPPED | OUTPATIENT
Start: 2020-11-06 | End: 2021-11-01

## 2020-11-06 RX ORDER — HYDROXYZINE HYDROCHLORIDE 25 MG/1
TABLET, FILM COATED ORAL
Qty: 60 TABLET | Refills: 3 | Status: SHIPPED | OUTPATIENT
Start: 2020-11-06 | End: 2022-03-24

## 2020-11-06 RX ORDER — LISINOPRIL 40 MG/1
40 TABLET ORAL DAILY
Qty: 90 TABLET | Refills: 3 | Status: SHIPPED | OUTPATIENT
Start: 2020-11-06 | End: 2021-10-25

## 2020-11-06 ASSESSMENT — MIFFLIN-ST. JEOR: SCORE: 1062.37

## 2020-11-06 NOTE — PATIENT INSTRUCTIONS
For blood pressure:  Continue lisinopril 40 mg daily.  Add metoprolol 25 mg daily.  Return for recheck in one month - may be the the RN if you prefer.      Schedule mammogram: [phone]    Return FIT kit.

## 2020-11-20 DIAGNOSIS — E78.5 HYPERLIPIDEMIA LDL GOAL <130: ICD-10-CM

## 2020-11-20 DIAGNOSIS — I10 BENIGN ESSENTIAL HYPERTENSION: ICD-10-CM

## 2020-11-20 LAB
ANION GAP SERPL CALCULATED.3IONS-SCNC: 4 MMOL/L (ref 3–14)
BUN SERPL-MCNC: 22 MG/DL (ref 7–30)
CALCIUM SERPL-MCNC: 9.2 MG/DL (ref 8.5–10.1)
CHLORIDE SERPL-SCNC: 110 MMOL/L (ref 94–109)
CHOLEST SERPL-MCNC: 215 MG/DL
CO2 SERPL-SCNC: 29 MMOL/L (ref 20–32)
CREAT SERPL-MCNC: 0.82 MG/DL (ref 0.52–1.04)
GFR SERPL CREATININE-BSD FRML MDRD: 77 ML/MIN/{1.73_M2}
GLUCOSE SERPL-MCNC: 98 MG/DL (ref 70–99)
HDLC SERPL-MCNC: 70 MG/DL
LDLC SERPL CALC-MCNC: 129 MG/DL
NONHDLC SERPL-MCNC: 145 MG/DL
POTASSIUM SERPL-SCNC: 4 MMOL/L (ref 3.4–5.3)
SODIUM SERPL-SCNC: 143 MMOL/L (ref 133–144)
TRIGL SERPL-MCNC: 81 MG/DL

## 2020-11-20 PROCEDURE — 80048 BASIC METABOLIC PNL TOTAL CA: CPT | Performed by: NURSE PRACTITIONER

## 2020-11-20 PROCEDURE — 36415 COLL VENOUS BLD VENIPUNCTURE: CPT | Performed by: NURSE PRACTITIONER

## 2020-11-20 PROCEDURE — 80061 LIPID PANEL: CPT | Performed by: NURSE PRACTITIONER

## 2021-01-18 ENCOUNTER — TELEPHONE (OUTPATIENT)
Dept: FAMILY MEDICINE | Facility: CLINIC | Age: 61
End: 2021-01-18

## 2021-01-18 NOTE — TELEPHONE ENCOUNTER
BP high in November and metoprolol added.  Please call and help her schedule a follow up with me for recheck.  Xochilt Ro, CNP

## 2021-01-19 NOTE — TELEPHONE ENCOUNTER
Patient Quality Outreach Summary      Summary:    Patient is due/failing the following:   BP check with provider.  See provider message.    Type of outreach:    Phone, left message for patient/parent to call back.    Questions for provider review:    None                                                                                                                    JORGE Burton MA       Chart routed to Care Team.

## 2021-01-29 ENCOUNTER — ALLIED HEALTH/NURSE VISIT (OUTPATIENT)
Dept: FAMILY MEDICINE | Facility: CLINIC | Age: 61
End: 2021-01-29
Payer: COMMERCIAL

## 2021-01-29 ENCOUNTER — TELEPHONE (OUTPATIENT)
Dept: FAMILY MEDICINE | Facility: CLINIC | Age: 61
End: 2021-01-29

## 2021-01-29 VITALS — HEART RATE: 76 BPM | DIASTOLIC BLOOD PRESSURE: 82 MMHG | SYSTOLIC BLOOD PRESSURE: 152 MMHG

## 2021-01-29 DIAGNOSIS — I10 HYPERTENSION GOAL BP (BLOOD PRESSURE) < 140/90: Primary | ICD-10-CM

## 2021-01-29 PROCEDURE — 99207 PR NO CHARGE LOS: CPT

## 2021-01-29 NOTE — TELEPHONE ENCOUNTER
Covering for primary/ordering provider:  Please have her schedule VV with PCP to discuss.  Katelyn Aguilar, CNP

## 2021-01-29 NOTE — NURSING NOTE
S-(situation): Patient in clinic today for bp check.  Taking lisinopril 40 mg daily and metoprolol 25 mg daily (this is new med since 11-6-20) for bp control.  Denies s/s of high or low bp. Patient reports she checks her bp daily and SBP is in the 140s and DBP is around 84.  She reports her current job is stressful so bp elevated in the afternoon.  She also states she gets a headache when her bp is elevated so she will take an additional 20 mg lisinopril and her headache will be relieved.  She kind of watches her sodium intake - does not pay close attention to it.    B-(background): Blood pressures in clinic:  1st reading = 160/78, 76  2nd reading = 142/82, 76    A-(assessment): Bp still above goal.    R-(recommendations): Please advise.  Pharm ready.    Routing to provider.  Meghan DIXON, MSN, RN

## 2021-01-29 NOTE — TELEPHONE ENCOUNTER
Message left for patient to return call to clinic.  CSS - ok to deliver message below.    Meghan DIXON MSN, RN

## 2021-01-29 NOTE — TELEPHONE ENCOUNTER
RN attempted to explain why patient needs to be seen - discuss her bp and that she is taking more medications than prescribed.  Patient is upset that she needs to be seen, RN was able to make appt for 2-1-21, confirmed the time, but patient got upset and ended the call abruptly.    Meghan DIXON MSN, RN

## 2021-02-01 ENCOUNTER — TELEPHONE (OUTPATIENT)
Dept: FAMILY MEDICINE | Facility: CLINIC | Age: 61
End: 2021-02-01

## 2021-02-01 DIAGNOSIS — I10 BENIGN ESSENTIAL HYPERTENSION: Primary | ICD-10-CM

## 2021-02-01 RX ORDER — METOPROLOL SUCCINATE 50 MG/1
50 TABLET, EXTENDED RELEASE ORAL DAILY
Qty: 90 TABLET | Refills: 3 | Status: SHIPPED | OUTPATIENT
Start: 2021-02-01 | End: 2021-11-01

## 2021-02-01 NOTE — TELEPHONE ENCOUNTER
Patient returned call. She is given new medication instructions as per Xochilt Ro note. Patient understands and can repeat instructions.  Thank you. Erica DASILVA RN

## 2021-02-01 NOTE — TELEPHONE ENCOUNTER
Please review previous phone note.    BP last week was still high.  Continue lisinopril 40 mg daily  Increase metoprolol to 50 mg daily.  Follow up with the RN for a blood pressure recheck in one month.      Please instruct her to not take an extra lisinopril when she has a headache - the 40 mg daily of lisinopril that she is already on is the highest dose she can take.    Xochilt Ro, CNP

## 2021-02-22 ENCOUNTER — OFFICE VISIT (OUTPATIENT)
Dept: FAMILY MEDICINE | Facility: CLINIC | Age: 61
End: 2021-02-22
Payer: COMMERCIAL

## 2021-02-22 VITALS
DIASTOLIC BLOOD PRESSURE: 68 MMHG | RESPIRATION RATE: 12 BRPM | BODY MASS INDEX: 26.87 KG/M2 | WEIGHT: 128 LBS | HEART RATE: 60 BPM | TEMPERATURE: 98.7 F | OXYGEN SATURATION: 99 % | SYSTOLIC BLOOD PRESSURE: 136 MMHG | HEIGHT: 58 IN

## 2021-02-22 DIAGNOSIS — I10 HYPERTENSION GOAL BP (BLOOD PRESSURE) < 140/90: Primary | ICD-10-CM

## 2021-02-22 DIAGNOSIS — Z12.11 SCREENING FOR COLON CANCER: ICD-10-CM

## 2021-02-22 PROCEDURE — 99213 OFFICE O/P EST LOW 20 MIN: CPT | Performed by: NURSE PRACTITIONER

## 2021-02-22 RX ORDER — METOPROLOL SUCCINATE 25 MG/1
TABLET, EXTENDED RELEASE ORAL
COMMUNITY
Start: 2021-01-01 | End: 2021-02-22

## 2021-02-22 ASSESSMENT — MIFFLIN-ST. JEOR: SCORE: 1049.32

## 2021-02-22 NOTE — PROGRESS NOTES
"    Assessment & Plan     Hypertension goal BP (blood pressure) < 140/90  Well controlled.  Continue lisinopril and metoprolol at current doses.    Screening for colon cancer  - Fecal colorectal cancer screen (FIT); Future      Return in about 6 months (around 8/22/2021).    The risks, benefits and treatment options of prescribed medications or other treatments have been discussed with the patient. The patient verbalized their understanding and should call or follow up if no improvement or if they develop further problems.    JUN Arreguin CNP  [facility] is a 60 year old who presents for the following health issues     HPI     Hypertension Follow-up      Do you check your blood pressure regularly outside of the clinic? Yes     Are you following a low salt diet? No    Are your blood pressures ever more than 140 on the top number (systolic) OR more   than 90 on the bottom number (diastolic), for example 140/90? Yes, but it goes down after she rests      How many servings of fruits and vegetables do you eat daily?  2-3    On average, how many sweetened beverages do you drink each day (Examples: soda, juice, sweet tea, etc.  Do NOT count diet or artificially sweetened beverages)?   2-  Sweet tea-  In the morning and evening       How many days per week do you exercise enough to make your heart beat faster?  2-3    How many minutes a day do you exercise enough to make your heart beat faster? 10 - 19    How many days per week do you miss taking your medication? 0        Review of Systems   Constitutional, HEENT, cardiovascular, pulmonary, gi and gu systems are negative, except as otherwise noted.      Objective    /68 (BP Location: Right arm)   Pulse 60   Temp 98.7  F (37.1  C) (Tympanic)   Resp 12   Ht 1.48 m (4' 10.25\")   Wt 58.1 kg (128 lb)   SpO2 99%   BMI 26.52 kg/m    Body mass index is 26.52 kg/m .  Physical Exam   GENERAL: healthy, alert and " no distress  RESP: lungs clear to auscultation - no rales, rhonchi or wheezes  CV: regular rate and rhythm, normal S1 S2, no S3 or S4, no murmur, click or rub, no peripheral edema and peripheral pulses strong

## 2021-02-22 NOTE — PATIENT INSTRUCTIONS
You are due for a screening Mammogram.  Please contact the Diagnostics Registration Department at: [phone] to schedule this appointment.  Your clinic record indicates that you are due for:   Pap and physical exam and Colonoscopy or yearly stool blood testing (FIT)        Thank you for choosing [facility].  You may be receiving an email and/or telephone survey request from [facility] [employer] regarding your visit today.  Please take a few minutes to respond to the survey to let us know how we are doing.      If you have questions or concerns, please contact us via [facility] or you can contact your care team at [phone].    Our Clinic hours are:  Monday 6:40 am  to 7:00 pm  Tuesday -Friday 6:40 am to 5:00 pm    The [place] outpatient lab hours are:  Monday - Friday 6:10 am to 4:45 pm  Saturdays 7:00 am to 11:00 am  Appointments are required, call [phone]    If you have clinical questions after hours or would like to schedule an appointment,  call the clinic at [phone].

## 2021-08-16 DIAGNOSIS — I10 BENIGN ESSENTIAL HYPERTENSION: ICD-10-CM

## 2021-08-17 ENCOUNTER — HOSPITAL ENCOUNTER (EMERGENCY)
Facility: CLINIC | Age: 61
Discharge: HOME | End: 2021-08-17
Attending: EMERGENCY MEDICINE | Admitting: EMERGENCY MEDICINE
Payer: OTHER MISCELLANEOUS

## 2021-08-17 VITALS
HEART RATE: 61 BPM | SYSTOLIC BLOOD PRESSURE: 161 MMHG | BODY MASS INDEX: 24.87 KG/M2 | OXYGEN SATURATION: 100 % | WEIGHT: 120 LBS | RESPIRATION RATE: 18 BRPM | TEMPERATURE: 98.2 F | DIASTOLIC BLOOD PRESSURE: 86 MMHG

## 2021-08-17 DIAGNOSIS — Z77.098 CHEMICAL EXPOSURE OF EYE: ICD-10-CM

## 2021-08-17 PROCEDURE — 99283 EMERGENCY DEPT VISIT LOW MDM: CPT | Performed by: EMERGENCY MEDICINE

## 2021-08-17 PROCEDURE — 99284 EMERGENCY DEPT VISIT MOD MDM: CPT | Performed by: EMERGENCY MEDICINE

## 2021-08-17 PROCEDURE — 250N000009 HC RX 250: Performed by: EMERGENCY MEDICINE

## 2021-08-17 RX ORDER — PROPARACAINE HYDROCHLORIDE 5 MG/ML
1 SOLUTION/ DROPS OPHTHALMIC ONCE
Status: DISCONTINUED | OUTPATIENT
Start: 2021-08-17 | End: 2021-08-17 | Stop reason: ALTCHOICE

## 2021-08-17 RX ORDER — CIPROFLOXACIN HYDROCHLORIDE 3.5 MG/ML
1-2 SOLUTION/ DROPS TOPICAL EVERY 4 HOURS
Qty: 5 ML | Refills: 0 | Status: SHIPPED | OUTPATIENT
Start: 2021-08-17 | End: 2021-11-01

## 2021-08-17 RX ORDER — TETRACAINE HYDROCHLORIDE 5 MG/ML
1-2 SOLUTION OPHTHALMIC ONCE
Status: COMPLETED | OUTPATIENT
Start: 2021-08-17 | End: 2021-08-17

## 2021-08-17 RX ADMIN — TETRACAINE HYDROCHLORIDE 2 DROP: 5 SOLUTION OPHTHALMIC at 16:59

## 2021-08-17 ASSESSMENT — VISUAL ACUITY
OS: 20/70
OD: 20/40

## 2021-08-18 RX ORDER — LISINOPRIL 40 MG/1
TABLET ORAL
Qty: 90 TABLET | Refills: 1 | OUTPATIENT
Start: 2021-08-18

## 2021-08-18 ASSESSMENT — VISUAL ACUITY: OU: 1

## 2021-08-18 NOTE — ED PROVIDER NOTES
History     Chief Complaint   Patient presents with     Eye Problem     unk chemical exposure at work. washed eye at work eye station     HPI  Flor Seth is a 61 year old female with past medical history significant for hypertension panic disorder hyperlipidemia and iron deficiency anemia who presents emergency department complaining of left thigh pain status post chemical exposure.  Patient was at work and was taking a bag of garbage out when she open the bag some liquid flew out and caught her in the left eye.  She has been having irritation in the eye since that time and the eyes become injected.  This occurred this afternoon.  Patient states she has no idea what the liquid was.  As it just came out of the bag no obvious bottles or chemicals were noted.  She states there has been some watery drainage from the eye but she denies any other symptoms.  She does not have a headache.  She is able to read fine print without difficulty.    Allergies:  Allergies   Allergen Reactions     Vicodin [Hydrocodone-Acetaminophen] Nausea and Vomiting       Problem List:    Patient Active Problem List    Diagnosis Date Noted     Anxiety 09/08/2014     Priority: Medium     Fibroid uterus 06/09/2011     Priority: Medium     Hyperlipidemia LDL goal <130 05/26/2009     Priority: Medium     Globus hystericus 02/25/2009     Priority: Medium     DDx: GERD, Thyroiditis, dysphagia.  Start with TSH and empiric omeprazole and f/u 3-4 weeks if persists       Abnromal glucose 07/19/2006     Priority: Medium     Insomnia 07/15/2005     Priority: Medium     November 13, 2007  Getting to sleep after night shift can be difficult  Problem list name updated by automated process. Provider to review       Panic disorder without agoraphobia 07/08/2005     Priority: Medium     July 8, 2005: Seen in ER and given ativan.  Screened for depression - zung=35.  Recommend [place]:  Anxiety and Phobia workbook.  I also recommend counseling.  Consider  Clonazepam.       Hypertension goal BP (blood pressure) < 140/90 2005     Priority: Medium        Past Medical History:    Past Medical History:   Diagnosis Date     Anemia      Hypertension      Iron deficiency anemia 2005     menorrhagia      Menorrhagia 2011       Past Surgical History:    Past Surgical History:   Procedure Laterality Date      SECTION      twins     DILATION AND CURETTAGE, HYSTEROSCOPY, ABLATE ENDOMETRIUM NOVASURE, COMBINED  2011    D&C HSC ablation     PHACOEMULSIFICATION CLEAR CORNEA WITH TORIC INTRAOCULAR LENS IMPLANT Right 2019    Procedure: Cataract removal with implant. (Symfony lens);  Surgeon: Carmelo Lock MD;  Location: [facility] OR     PHACOEMULSIFICATION CLEAR CORNEA WITH TORIC INTRAOCULAR LENS IMPLANT Left 2019    Procedure: Cataract removal with implant. (Symfony lens);  Surgeon: Carmelo Lock MD;  Location: [facility] OR     SURGICAL HISTORY OF -   , ,     Vaginal delivery       Family History:    Family History   Problem Relation Age of Onset     C.A.D. Mother      Hypertension Other      Cancer No family hx of        Social History:  Marital Status:   [2]  Social History     Tobacco Use     Smoking status: Never Smoker     Smokeless tobacco: Never Used   Substance Use Topics     Alcohol use: No     Drug use: No        Medications:    ASPIRIN 81 MG OR TABS  ciprofloxacin (CILOXAN) 0.3 % ophthalmic solution  hydrOXYzine (ATARAX) 25 MG tablet  lisinopril (ZESTRIL) 40 MG tablet  metoprolol succinate ER (TOPROL-XL) 50 MG 24 hr tablet  Multiple Vitamin (MULTI-VITAMIN) per tablet  OMEGA 3 OR  pravastatin (PRAVACHOL) 20 MG tablet          Review of Systems  As per HPI.  Physical Exam   BP: (!) 170/95  Pulse: 73  Temp: 98.2  F (36.8  C)  Resp: 18  Weight: 54.4 kg (120 lb)  SpO2: 98 %      Physical Exam  Vitals and nursing note reviewed.   Constitutional:       Appearance: Normal appearance. She is not ill-appearing,  toxic-appearing or diaphoretic.      Comments: Mild distress secondary to eye pain.   HENT:      Head: Normocephalic.      Nose: Nose normal.      Mouth/Throat:      Mouth: Mucous membranes are moist.      Pharynx: Oropharynx is clear.   Eyes:      General: Lids are normal. Lids are everted, no foreign bodies appreciated. Vision grossly intact. No visual field deficit.        Left eye: Discharge present.No foreign body or hordeolum.      Extraocular Movements: Extraocular movements intact.      Left eye: Normal extraocular motion and no nystagmus.      Conjunctiva/sclera:      Left eye: Left conjunctiva is not injected. Exudate present.      Pupils: Pupils are equal, round, and reactive to light.      Left eye: No corneal abrasion or fluorescein uptake. Samantha exam negative.     Slit lamp exam:     Left eye: Anterior chamber quiet. No corneal flare, corneal ulcer or foreign body.   Pulmonary:      Effort: Pulmonary effort is normal.   Musculoskeletal:         General: Normal range of motion.      Cervical back: Normal range of motion.   Skin:     General: Skin is warm and dry.      Findings: No rash.   Neurological:      General: No focal deficit present.      Mental Status: She is alert and oriented to person, place, and time.   Psychiatric:         Mood and Affect: Mood normal.         ED Course        Procedures              Critical Care time:  none               No results found for this or any previous visit (from the past 24 hour(s)).    Medications   tetracaine (PONTOCAINE) 0.5 % ophthalmic solution 1-2 drop (2 drops Left Eye Given 8/17/21 6137)       Assessments & Plan (with Medical Decision Making) records were reviewed.  Pontocaine was placed in the left eye.  pH was checked and was 7.0.  The eye was irrigated with copious amounts of normal saline.  Recheck pH was 7 I again irrigated the eye a second time.  On evaluation I do not see any foreign body.  On slit-lamp exam there is no obvious abrasion  although the cornea appears inflamed no obvious significant fluorescein uptake at this time.  After second irrigation pH was again checked and was normal patient is able to read fine print without difficulty and states she is comfortable.  I am going to start her on Ciloxan eyedrops and have her follow-up with an ophthalmologist as needed.  If worsening pain vision changes headache or other symptoms present patient should immediately return to the emergency department for further evaluation and care.  Patient is agreement this plan.     I have reviewed the nursing notes.    I have reviewed the findings, diagnosis, plan and need for follow up with the patient.       Discharge Medication List as of 8/17/2021  7:51 PM      START taking these medications    Details   ciprofloxacin (CILOXAN) 0.3 % ophthalmic solution Place 1-2 drops Into the left eye every 4 hours, Disp-5 mL, R-0, Local Print             Final diagnoses:   Chemical exposure of eye       8/17/2021   [facility] EMERGENCY DEPT     Urbano Springer MD  08/18/21 0915

## 2021-08-18 NOTE — DISCHARGE INSTRUCTIONS
Turn if symptoms worsen or new symptoms develop.  Follow-up with primary care physician next available.  Drink plenty of fluids.  If any increased eye pain or visual changes please return for recheck or follow-up with ophthalmologist take Ciloxan drops as directed take ibuprofen or Tylenol for pain.

## 2021-10-22 DIAGNOSIS — I10 BENIGN ESSENTIAL HYPERTENSION: ICD-10-CM

## 2021-10-22 NOTE — TELEPHONE ENCOUNTER
Pending Prescriptions:                       Disp   Refills    lisinopril (ZESTRIL) 40 MG tablet [Pharmac*90 tab*1        Sig: TAKE 1 TABLET BY MOUTH ONCE DAILY        Routing refill request to provider for review/approval because:  Blood pressure under 140/90 in past 12 months        BP Readings from Last 3 Encounters:   08/17/21 (!) 161/86   02/22/21 136/68   01/29/21 (!) 152/82         Last Written Prescription Date:  11/6/20  Last Fill Quantity: 90,  # refills: 3   Last office visit: 2/22/2021 with prescribing provider.        Grecia Osborne RN

## 2021-10-25 RX ORDER — LISINOPRIL 40 MG/1
40 TABLET ORAL DAILY
Qty: 30 TABLET | Refills: 0 | Status: SHIPPED | OUTPATIENT
Start: 2021-10-25 | End: 2021-11-01

## 2021-11-01 ENCOUNTER — OFFICE VISIT (OUTPATIENT)
Dept: FAMILY MEDICINE | Facility: CLINIC | Age: 61
End: 2021-11-01
Payer: COMMERCIAL

## 2021-11-01 VITALS
HEART RATE: 60 BPM | WEIGHT: 135.9 LBS | RESPIRATION RATE: 12 BRPM | TEMPERATURE: 98.2 F | HEIGHT: 58 IN | OXYGEN SATURATION: 100 % | DIASTOLIC BLOOD PRESSURE: 85 MMHG | SYSTOLIC BLOOD PRESSURE: 165 MMHG | BODY MASS INDEX: 28.53 KG/M2

## 2021-11-01 DIAGNOSIS — I10 BENIGN ESSENTIAL HYPERTENSION: Primary | ICD-10-CM

## 2021-11-01 DIAGNOSIS — Z12.11 COLON CANCER SCREENING: ICD-10-CM

## 2021-11-01 DIAGNOSIS — Z23 NEED FOR PROPHYLACTIC VACCINATION AND INOCULATION AGAINST INFLUENZA: ICD-10-CM

## 2021-11-01 DIAGNOSIS — E78.5 HYPERLIPIDEMIA LDL GOAL <130: ICD-10-CM

## 2021-11-01 LAB
ANION GAP SERPL CALCULATED.3IONS-SCNC: 6 MMOL/L (ref 3–14)
BUN SERPL-MCNC: 14 MG/DL (ref 7–30)
CALCIUM SERPL-MCNC: 8.7 MG/DL (ref 8.5–10.1)
CHLORIDE BLD-SCNC: 110 MMOL/L (ref 94–109)
CHOLEST SERPL-MCNC: 194 MG/DL
CO2 SERPL-SCNC: 25 MMOL/L (ref 20–32)
CREAT SERPL-MCNC: 0.63 MG/DL (ref 0.52–1.04)
FASTING STATUS PATIENT QL REPORTED: NO
GFR SERPL CREATININE-BSD FRML MDRD: >90 ML/MIN/1.73M2
GLUCOSE BLD-MCNC: 96 MG/DL (ref 70–99)
HDLC SERPL-MCNC: 73 MG/DL
LDLC SERPL CALC-MCNC: 106 MG/DL
NONHDLC SERPL-MCNC: 121 MG/DL
POTASSIUM BLD-SCNC: 3.9 MMOL/L (ref 3.4–5.3)
SODIUM SERPL-SCNC: 141 MMOL/L (ref 133–144)
TRIGL SERPL-MCNC: 77 MG/DL

## 2021-11-01 PROCEDURE — 99214 OFFICE O/P EST MOD 30 MIN: CPT | Mod: 25 | Performed by: NURSE PRACTITIONER

## 2021-11-01 PROCEDURE — 90682 RIV4 VACC RECOMBINANT DNA IM: CPT | Performed by: NURSE PRACTITIONER

## 2021-11-01 PROCEDURE — 36415 COLL VENOUS BLD VENIPUNCTURE: CPT | Performed by: NURSE PRACTITIONER

## 2021-11-01 PROCEDURE — 80048 BASIC METABOLIC PNL TOTAL CA: CPT | Performed by: NURSE PRACTITIONER

## 2021-11-01 PROCEDURE — 80061 LIPID PANEL: CPT | Performed by: NURSE PRACTITIONER

## 2021-11-01 PROCEDURE — 90471 IMMUNIZATION ADMIN: CPT | Performed by: NURSE PRACTITIONER

## 2021-11-01 RX ORDER — LISINOPRIL 40 MG/1
40 TABLET ORAL DAILY
Qty: 90 TABLET | Refills: 3 | Status: SHIPPED | OUTPATIENT
Start: 2021-11-01 | End: 2022-03-24

## 2021-11-01 RX ORDER — PRAVASTATIN SODIUM 20 MG
20 TABLET ORAL DAILY
Qty: 90 TABLET | Refills: 3 | Status: SHIPPED | OUTPATIENT
Start: 2021-11-01 | End: 2022-03-24

## 2021-11-01 RX ORDER — HYDROCHLOROTHIAZIDE 12.5 MG/1
12.5 TABLET ORAL DAILY
Qty: 90 TABLET | Refills: 3 | Status: SHIPPED | OUTPATIENT
Start: 2021-11-01 | End: 2022-03-24

## 2021-11-01 RX ORDER — METOPROLOL SUCCINATE 50 MG/1
50 TABLET, EXTENDED RELEASE ORAL DAILY
Qty: 90 TABLET | Refills: 3 | Status: SHIPPED | OUTPATIENT
Start: 2021-11-01 | End: 2022-03-24

## 2021-11-01 ASSESSMENT — MIFFLIN-ST. JEOR: SCORE: 1080.16

## 2021-11-01 NOTE — PROGRESS NOTES
"SUBJECTIVE     Chief complaint: Blood pressure check     HPI: Flor Seth 61 year old female presents for blood pressure check. Patient's blood pressures have been running high when she has medical visits. She is currently on 40 mg lisinopril daily and 50 mg metoprolol succinate daily. She reports that she takes these medications regularly. She endorses feeling stressed at work but other than that she feels like her anxiety is well controlled, she only uses the atarax about twice a month. She notes that she has been following a low sodium diet \"on and off.\" She uses the stationary bike at least 2 times a week for about an hour and she also goes to [employer] about twice a week. Patient does take her blood pressure at home daily and notes that those numbers are better controlled. Her range is typically 130-140/70s. Patient denies any headaches and vision changes.       Review Of Systems  Eyes: Denies blurred vision, double vision or vision changes.  Respiratory: No shortness of breath, dyspnea on exertion, cough, or hemoptysis  Cardiovascular: negative for, palpitations, tachycardia, irregular heart beat, chest pain, lower extremity edema, syncope or near-syncope and exercise intolerance  Neurologic: Denies headache, denies muscle weakness, denies numbness or tingling.   Psychiatric: Denies excessive stress or anxiety.       Past Medical History:   Diagnosis Date     Anemia      Hypertension      Iron deficiency anemia 2005     menorrhagia      Menorrhagia 2011    S/p ablation         Past Surgical History:   Procedure Laterality Date      SECTION      twins     DILATION AND CURETTAGE, HYSTEROSCOPY, ABLATE ENDOMETRIUM [place]AWNA, COMBINED  2011    D&C HSC ablation     PHACOEMULSIFICATION CLEAR CORNEA WITH TORIC INTRAOCULAR LENS IMPLANT Right 2019    Procedure: Cataract removal with implant. (Symfony lens);  Surgeon: Carmelo Lock MD;  Location: [facility] OR     PHACOEMULSIFICATION CLEAR " CORNEA WITH TORIC INTRAOCULAR LENS IMPLANT Left 11/27/2019    Procedure: Cataract removal with implant. (Symfony lens);  Surgeon: Carmelo Lock MD;  Location: [facility]     SURGICAL HISTORY OF -   1989, 1992, 1996    Vaginal delivery        Current Outpatient Medications   Medication     ASPIRIN 81 MG OR TABS     hydrOXYzine (ATARAX) 25 MG tablet     lisinopril (ZESTRIL) 40 MG tablet     metoprolol succinate ER (TOPROL-XL) 50 MG 24 hr tablet     Multiple Vitamin (MULTI-VITAMIN) per tablet     OMEGA 3 OR     pravastatin (PRAVACHOL) 20 MG tablet     No current facility-administered medications for this visit.        Allergies   Allergen Reactions     Vicodin [Hydrocodone-Acetaminophen] Nausea and Vomiting        Family History   Problem Relation Age of Onset     C.A.D. Mother      Hypertension Other      Cancer No family hx of         Social History     Socioeconomic History     Marital status:      Spouse name: None     Number of children: None     Years of education: None     Highest education level: None   Occupational History     None   Tobacco Use     Smoking status: Never Smoker     Smokeless tobacco: Never Used   Vaping Use     Vaping Use: Never used   Substance and Sexual Activity     Alcohol use: No     Drug use: No     Sexual activity: Yes     Partners: Male     Birth control/protection: Condom   Other Topics Concern     Parent/sibling w/ CABG, MI or angioplasty before 65F 55M? Yes   Social History Narrative     None     Social Determinants of Health     Financial Resource Strain:      Difficulty of Paying Living Expenses:    Food Insecurity:      Worried About Running Out of Food in the Last Year:      Ran Out of Food in the Last Year:    Transportation Needs:      Lack of Transportation (Medical):      Lack of Transportation (Non-Medical):    Physical Activity:      Days of Exercise per Week:      Minutes of Exercise per Session:    Stress:      Feeling of Stress :    Social Connections:       "Frequency of Communication with Friends and Family:      Frequency of Social Gatherings with Friends and Family:      Attends Islam Services:      Active Member of Clubs or Organizations:      Attends Club or Organization Meetings:      Marital Status:    Intimate Partner Violence:      Fear of Current or Ex-Partner:      Emotionally Abused:      Physically Abused:      Sexually Abused:         OBJECTIVE     BP (!) 180/86 (BP Location: Right arm, Patient Position: Chair, Cuff Size: Adult Regular)   Pulse 60   Temp 98.2  F (36.8  C) (Tympanic)   Resp 12   Ht 1.48 m (4' 10.25\")   Wt 61.6 kg (135 lb 14.4 oz)   SpO2 100%   BMI 28.16 kg/m    BP recheck was 165/85    General is a  61 year old female sitting comfortably in no apparent distress.   CV: Regular rate and rhythm S1S2 without rubs, murmurs or gallops,   Lungs: Clear to auscultation bilaterally  Extremities: Warm, No Edema, 2+ Pedal and radial pulses bilaterally  Neuro: Able to ambulate around the exam room with equal movement, strength and normal coordination of the upper and lower extremeties symmetrically    ASSESSMENT/PLAN     Hypertension  -Uncontrolled HTN. Today patient is 180/86 and 165/85. On chart review she appears consistently elevated since 2019.  -Discussed lifestyle changes including low sodium (less than 2 g) diet, continuing activity and managing stress  - Continue with lisinopril 40 mg daily  -Continue metoprolol succinate 50 mg daily. Will not increase this dose as patient's HR is 60.  -Add 12.5 mg hydrochlorothiazide daily.  -BMP today. Last done 11/2020 and was WNL.   -Follow up in 1 month for BP recheck     Hyperlipidemia  -Non fasting lipid panel today  -Last done 11/2020 TC 21, TG 81, HDL 70.   - pravastatin 20 mg daily refilled     Colon Cancer Screening  -Patient would like to do FIT testing. Order placed and kit sent home with patient.      Need for flu vaccine  -Ordered today. Reviewed side effects including arm " soreness, low grade fever, malaise and fatigue for one day. Patient agreed to vaccine.     Judith Abrams RN ([facility]) on 11/1/2021 at 4:24 PM      I saw this patient in collaboration with Judith Abrams        I was present with the APRN/PA student (Judith Abrams) who participated in the service and in the documentation of the services provided. I have verified the history and personally performed the physical exam and medical decision making, as documented by the student and edited by me.    Xochilt Ro, CNP

## 2021-11-02 ENCOUNTER — TELEPHONE (OUTPATIENT)
Dept: FAMILY MEDICINE | Facility: CLINIC | Age: 61
End: 2021-11-02

## 2021-11-02 NOTE — TELEPHONE ENCOUNTER
Pt called for lab results.  Given to pt by phone.  Letter was sent earlier today.    Yulissa Rojas RN

## 2022-01-10 ENCOUNTER — OFFICE VISIT (OUTPATIENT)
Dept: FAMILY MEDICINE | Facility: CLINIC | Age: 62
End: 2022-01-10
Payer: COMMERCIAL

## 2022-01-10 VITALS
DIASTOLIC BLOOD PRESSURE: 70 MMHG | WEIGHT: 136 LBS | OXYGEN SATURATION: 99 % | SYSTOLIC BLOOD PRESSURE: 116 MMHG | RESPIRATION RATE: 12 BRPM | TEMPERATURE: 98.4 F | HEART RATE: 73 BPM | HEIGHT: 59 IN | BODY MASS INDEX: 27.42 KG/M2

## 2022-01-10 DIAGNOSIS — I10 HYPERTENSION GOAL BP (BLOOD PRESSURE) < 140/90: Primary | ICD-10-CM

## 2022-01-10 LAB
ANION GAP SERPL CALCULATED.3IONS-SCNC: 6 MMOL/L (ref 3–14)
BUN SERPL-MCNC: 16 MG/DL (ref 7–30)
CALCIUM SERPL-MCNC: 9.1 MG/DL (ref 8.5–10.1)
CHLORIDE BLD-SCNC: 102 MMOL/L (ref 94–109)
CO2 SERPL-SCNC: 30 MMOL/L (ref 20–32)
CREAT SERPL-MCNC: 0.81 MG/DL (ref 0.52–1.04)
GFR SERPL CREATININE-BSD FRML MDRD: 83 ML/MIN/1.73M2
GLUCOSE BLD-MCNC: 122 MG/DL (ref 70–99)
POTASSIUM BLD-SCNC: 3.4 MMOL/L (ref 3.4–5.3)
SODIUM SERPL-SCNC: 138 MMOL/L (ref 133–144)

## 2022-01-10 PROCEDURE — 99213 OFFICE O/P EST LOW 20 MIN: CPT | Performed by: NURSE PRACTITIONER

## 2022-01-10 PROCEDURE — 80048 BASIC METABOLIC PNL TOTAL CA: CPT | Performed by: NURSE PRACTITIONER

## 2022-01-10 PROCEDURE — 36415 COLL VENOUS BLD VENIPUNCTURE: CPT | Performed by: NURSE PRACTITIONER

## 2022-01-10 ASSESSMENT — MIFFLIN-ST. JEOR: SCORE: 1084.58

## 2022-01-10 NOTE — PROGRESS NOTES
"  Assessment & Plan     Hypertension goal BP (blood pressure) < 140/90  -well controlled, no side effects from BP medications, BMP normal, recommended to continue current treatment plan   - Basic metabolic panel  (Ca, Cl, CO2, Creat, Gluc, K, Na, BUN); Future  - Basic metabolic panel  (Ca, Cl, CO2, Creat, Gluc, K, Na, BUN)      Return in about 1 year (around 1/10/2023) for Routine Visit.    JUN Walsh CNP  [facility]    Oswaldo Ray is a 61 year old who presents for the following health issues     HPI     Hypertension Follow-up      Do you check your blood pressure regularly outside of the clinic? Yes     Are you following a low salt diet? Yes    Are your blood pressures ever more than 140 on the top number (systolic) OR more   than 90 on the bottom number (diastolic), for example 140/90? No, not since new medicine      How many servings of fruits and vegetables do you eat daily?  0-1    On average, how many sweetened beverages do you drink each day (Examples: soda, juice, sweet tea, etc.  Do NOT count diet or artificially sweetened beverages)?   2    How many days per week do you exercise enough to make your heart beat faster? 3 or less    How many minutes a day do you exercise enough to make your heart beat faster? 30 - 60    How many days per week do you miss taking your medication? 0        Review of Systems   Constitutional, HEENT, cardiovascular, pulmonary, gi and gu systems are negative, except as otherwise noted.      Objective    /70 (BP Location: Left arm, Patient Position: Chair, Cuff Size: Adult Regular)   Pulse 73   Temp 98.4  F (36.9  C) (Tympanic)   Resp 12   Ht 1.486 m (4' 10.5\")   Wt 61.7 kg (136 lb)   SpO2 99%   BMI 27.94 kg/m    Body mass index is 27.94 kg/m .  Physical Exam   GENERAL: healthy, alert and no distress  EYES: Eyes grossly normal to inspection, PERRL and conjunctivae and sclerae normal  RESP: lungs clear to auscultation - no rales, " rhonchi or wheezes  CV: regular rate and rhythm, normal S1 S2, no S3 or S4, no murmur, click or rub, no peripheral edema and peripheral pulses strong  NEURO: Normal strength and tone, mentation intact and speech normal  PSYCH: mentation appears normal, affect normal/bright

## 2022-01-10 NOTE — LETTER
January 10, 2022      Flor Seth  [address] [place] [address]        Dear ,    We are writing to inform you of your test results.    Your test results fall within the expected range(s)/Normal    Resulted Orders   Basic metabolic panel  (Ca, Cl, CO2, Creat, Gluc, K, Na, BUN)   Result Value Ref Range    Sodium 138 133 - 144 mmol/L    Potassium 3.4 3.4 - 5.3 mmol/L    Chloride 102 94 - 109 mmol/L    Carbon Dioxide (CO2) 30 20 - 32 mmol/L    Anion Gap 6 3 - 14 mmol/L    Urea Nitrogen 16 7 - 30 mg/dL    Creatinine 0.81 0.52 - 1.04 mg/dL    Calcium 9.1 8.5 - 10.1 mg/dL    Glucose 122 (H) 70 - 99 mg/dL    GFR Estimate 83 >60 mL/min/1.73m2      Comment:      Effective December 21, 2021 eGFRcr in adults is calculated using the 2021 CKD-EPI creatinine equation which includes age and gender ( et al., NEJM, DOI: 10.1056/OMIZez0197119)       If you have any questions or concerns, please call the clinic at the number listed above.       Sincerely,      JUN Nuñez CNP

## 2022-01-20 ENCOUNTER — TELEPHONE (OUTPATIENT)
Dept: FAMILY MEDICINE | Facility: CLINIC | Age: 62
End: 2022-01-20
Payer: COMMERCIAL

## 2022-01-20 NOTE — TELEPHONE ENCOUNTER
Pt is calling complaining of nausea.  She thinks it's from the hydrodiuril but she started taking that in November.  No vomiting  No diarrhea  No fever  No pain or shortness of breath.  No muscle cramps  Pt will try otc antinausea meds and keep very well hydrated with clear liquids until her stomach settles down.  She will go to the nearest UC if she can't keep fluids down. (She is out of town)

## 2022-02-01 ENCOUNTER — OFFICE VISIT (OUTPATIENT)
Dept: FAMILY MEDICINE | Facility: CLINIC | Age: 62
End: 2022-02-01
Payer: COMMERCIAL

## 2022-02-01 VITALS
TEMPERATURE: 98.6 F | OXYGEN SATURATION: 98 % | WEIGHT: 142.5 LBS | DIASTOLIC BLOOD PRESSURE: 84 MMHG | BODY MASS INDEX: 28.73 KG/M2 | RESPIRATION RATE: 16 BRPM | SYSTOLIC BLOOD PRESSURE: 156 MMHG | HEART RATE: 76 BPM | HEIGHT: 59 IN

## 2022-02-01 DIAGNOSIS — H69.93 DYSFUNCTION OF BOTH EUSTACHIAN TUBES: ICD-10-CM

## 2022-02-01 DIAGNOSIS — H61.22 IMPACTED CERUMEN OF LEFT EAR: Primary | ICD-10-CM

## 2022-02-01 PROCEDURE — 69209 REMOVE IMPACTED EAR WAX UNI: CPT | Mod: LT | Performed by: INTERNAL MEDICINE

## 2022-02-01 ASSESSMENT — MIFFLIN-ST. JEOR: SCORE: 1114.07

## 2022-02-01 NOTE — PATIENT INSTRUCTIONS
1. The plugging/pressure will take more time, no treatment is needed  2. The wax may have caused the crackling, should improve by tomorrow   Patient Education     Earache, No Infection (Adult)   Earaches can happen without an infection. They can occur when air and fluid build up behind the eardrum. They may cause a feeling of fullness and discomfort. They may also impair hearing. This is called otitis media with effusion (OME) or serous otitis media. It means there is fluid in the middle ear. It is not the same as acute otitis media, which is often from an infection.  OME can happen when you have a cold if congestion blocks the passage that drains the middle ear. This passage is called the eustachian tube. OME may also occur with nasal allergies or after a bacterial infection in the middle ear. Other causes are:    Trauma    Improper cleaning of wax from the ear    Bacterial infection of the mastoid bone (mastoiditis)    Tumor    Jaw pain    Changes in pressure, such as from flying or scuba diving    The pain or discomfort may come and go. You may hear clicking or popping sounds when you chew or swallow. You may feel that your balance is off. Or you may hear ringing in the ear.  It often takes from several weeks up to 3 months for the fluid to clear on its own. Oral pain relievers and ear drops help if there is pain. Decongestants and antihistamines sometimes help. Antibiotics don't help since there is no infection. Your healthcare provider may give you a nasal spray to help reduce swelling in the nose and eustachian tube. This can allow the ear to drain.  If your OME doesn't get better after 3 months, surgery may be used to drain the fluid. A small tube may also be put in the eardrum to help with drainage.  Because the middle ear fluid can become infected, watch for signs of an infection. These may develop later. They may include increased ear pain, fever, or drainage from the ear.  Home care  These home-care tips  will help you take care of yourself:    You may use over-the-counter medicine as directed by your healthcare provider to control pain, unless medicine was prescribed. If you have chronic liver or kidney disease or ever had a stomach ulcer or GI bleeding, talk with your healthcare provider before using any medicines.    Aspirin should never be used in anyone younger than age 18 who has a fever. It may cause severe liver damage.    Ask your healthcare provider if you may use over-the-counter decongestants such as phenylephrine or pseudoephedrine. Keep in mind they are not always helpful.    Talk with your healthcare provider about using nasal spray decongestants. Don't use them for more than 3 days, or as directed by your healthcare provider. Longer use can make congestion worse. Prescription nasal sprays from your healthcare provider don't often have such restrictions.    Antihistamines may help if you are also having allergy symptoms.    You may use medicines such as guaifenesin to thin mucus and help with drainage.  Follow-up care  Follow up with your healthcare provider or as advised if you are not feeling better after 3 days.  When to seek medical advice  Call your healthcare provider right away if any of these occur:    Ear pain that gets worse or that does not start to get better     Fever of 100.4 F (38 C) or higher, or as directed by your healthcare provider    Fluid or blood draining from the ear    Headache or sinus pain    Stiff neck    Unusual drowsiness or confusion  [employer] last reviewed this educational content on 12/1/2019 2000-2021 [employer]. All rights reserved. This information is not intended as a substitute for professional medical care. Always follow your healthcare professional's instructions.

## 2022-02-01 NOTE — PROGRESS NOTES
"  Assessment & Plan     Impacted cerumen of left ear symptoms improved a bit after lavage.  Other symptoms will resolve with time.    - REMOVE IMPACTED CERUMEN    Dysfunction of both eustachian tubes - due to altitude.  No follow-up needed               BMI:   Estimated body mass index is 29.28 kg/m  as calculated from the following:    Height as of this encounter: 1.486 m (4' 10.5\").    Weight as of this encounter: 64.6 kg (142 lb 8 oz).       Patient Instructions   1. The plugging/pressure will take more time, no treatment is needed  2. The wax may have caused the crackling, should improve by tomorrow   Patient Education     Earache, No Infection (Adult)   Earaches can happen without an infection. They can occur when air and fluid build up behind the eardrum. They may cause a feeling of fullness and discomfort. They may also impair hearing. This is called otitis media with effusion (OME) or serous otitis media. It means there is fluid in the middle ear. It is not the same as acute otitis media, which is often from an infection.  OME can happen when you have a cold if congestion blocks the passage that drains the middle ear. This passage is called the eustachian tube. OME may also occur with nasal allergies or after a bacterial infection in the middle ear. Other causes are:    Trauma    Improper cleaning of wax from the ear    Bacterial infection of the mastoid bone (mastoiditis)    Tumor    Jaw pain    Changes in pressure, such as from flying or scuba diving    The pain or discomfort may come and go. You may hear clicking or popping sounds when you chew or swallow. You may feel that your balance is off. Or you may hear ringing in the ear.  It often takes from several weeks up to 3 months for the fluid to clear on its own. Oral pain relievers and ear drops help if there is pain. Decongestants and antihistamines sometimes help. Antibiotics don't help since there is no infection. Your healthcare provider may give you " a nasal spray to help reduce swelling in the nose and eustachian tube. This can allow the ear to drain.  If your OME doesn't get better after 3 months, surgery may be used to drain the fluid. A small tube may also be put in the eardrum to help with drainage.  Because the middle ear fluid can become infected, watch for signs of an infection. These may develop later. They may include increased ear pain, fever, or drainage from the ear.  Home care  These home-care tips will help you take care of yourself:    You may use over-the-counter medicine as directed by your healthcare provider to control pain, unless medicine was prescribed. If you have chronic liver or kidney disease or ever had a stomach ulcer or GI bleeding, talk with your healthcare provider before using any medicines.    Aspirin should never be used in anyone younger than age 18 who has a fever. It may cause severe liver damage.    Ask your healthcare provider if you may use over-the-counter decongestants such as phenylephrine or pseudoephedrine. Keep in mind they are not always helpful.    Talk with your healthcare provider about using nasal spray decongestants. Don't use them for more than 3 days, or as directed by your healthcare provider. Longer use can make congestion worse. Prescription nasal sprays from your healthcare provider don't often have such restrictions.    Antihistamines may help if you are also having allergy symptoms.    You may use medicines such as guaifenesin to thin mucus and help with drainage.  Follow-up care  Follow up with your healthcare provider or as advised if you are not feeling better after 3 days.  When to seek medical advice  Call your healthcare provider right away if any of these occur:    Ear pain that gets worse or that does not start to get better     Fever of 100.4 F (38 C) or higher, or as directed by your healthcare provider    Fluid or blood draining from the ear    Headache or sinus pain    Stiff neck    Unusual  drowsiness or confusion  [employer] last reviewed this educational content on 12/1/2019 2000-2021 [employer]. All rights reserved. This information is not intended as a substitute for professional medical care. Always follow your healthcare professional's instructions.               Return in about 1 week (around 2/8/2022) for If symptoms don't improve or if they worsen.    Becca Conner [facility] a 61 year [facility] who presents for the following health issues   Chief Complaint   Patient presents with     Ear Problem     Pt having problems with ears feeling plugged, popping, right one worse.       HPI     Concern - ears plugged   Onset:  1 wk ago  Description: Pt feels her ears have been plugged and popping over the past week.  Intensity: moderate  Progression of Symptoms:  same  Accompanying Signs & Symptoms: only ears, sometimes hurts when it pops; hears crackling in right ear  Previous history of similar problem: maybe 6 years ago  Precipitating factors:        Worsened by: laying down feels worse  Alleviating factors:        Improved by: none  Therapies tried and outcome:  chiro helped for neck symptoms   --traveled to high altitude recently (drive)  --when lay on right, felt  nauseous w/out dizziness; felt she had to move slowly to worsen the symptoms       Current Outpatient Medications   Medication Sig Dispense Refill     ASPIRIN 81 MG OR TABS Take 81 mg by mouth daily        hydrochlorothiazide (HYDRODIURIL) 12.5 MG tablet Take 1 tablet (12.5 mg) by mouth daily 90 tablet 3     lisinopril (ZESTRIL) 40 MG tablet Take 1 tablet (40 mg) by mouth daily 90 tablet 3     metoprolol succinate ER (TOPROL-XL) 50 MG 24 hr tablet Take 1 tablet (50 mg) by mouth daily 90 tablet 3     Multiple Vitamin (MULTI-VITAMIN) per tablet Take 1 tablet by mouth daily.       OMEGA 3 OR Once daily       pravastatin (PRAVACHOL) 20 MG tablet Take 1 tablet (20 mg) by  "mouth daily 90 tablet 3     hydrOXYzine (ATARAX) 25 MG tablet TAKE ONE TABLET BY MOUTH EVERY 8 HOURS AS NEEDED FOR ANXIETY (Patient not taking: Reported on 2/1/2022) 60 tablet 3         Review of Systems   CONSTITUTIONAL: NEGATIVE for fever, chills, change in weight  RESP: NEGATIVE for significant cough or SOB  CV: NEGATIVE for chest pain, palpitations or peripheral edema  GI: NEGATIVE for nausea, abdominal pain, heartburn, or change in bowel habits      Objective    BP (!) 156/84   Pulse 76   Temp 98.6  F (37  C) (Tympanic)   Resp 16   Ht 1.486 m (4' 10.5\")   Wt 64.6 kg (142 lb 8 oz)   SpO2 98%   BMI 29.28 kg/m    Body mass index is 29.28 kg/m .  Physical Exam   GENERAL APPEARANCE: alert and no distress  HENT: ear canals occluded with cerumen bilaterally.  CMA lavaged with good results.  Canals clear, TM normal, no fluid                "

## 2022-03-01 ENCOUNTER — TELEPHONE (OUTPATIENT)
Dept: FAMILY MEDICINE | Facility: CLINIC | Age: 62
End: 2022-03-01
Payer: COMMERCIAL

## 2022-03-01 NOTE — TELEPHONE ENCOUNTER
Xochilt,    Patient calls and is having troubles with nausea.  Seems to be at night time.  Her sister in law is a RN and told her its the hydrochlorothiazide.  Please advise. Karen SWEET RN

## 2022-03-02 NOTE — TELEPHONE ENCOUNTER
It is hard for me to know exactly what is causing the nausea.  I am unsure if it is truly the hydrochlorothiazide causing nausea at night, as it usually is a medicine you take in the morning.  I suggest she make a clinic appointment with me to discuss further.  Xochilt Ro, CNP

## 2022-03-02 NOTE — TELEPHONE ENCOUNTER
Patient returns our call and she is given [employer]'s message then connected to [place]. Karen SWEET RN

## 2022-03-24 ENCOUNTER — OFFICE VISIT (OUTPATIENT)
Dept: FAMILY MEDICINE | Facility: CLINIC | Age: 62
End: 2022-03-24
Payer: COMMERCIAL

## 2022-03-24 VITALS
SYSTOLIC BLOOD PRESSURE: 136 MMHG | HEART RATE: 72 BPM | BODY MASS INDEX: 28.83 KG/M2 | WEIGHT: 143 LBS | DIASTOLIC BLOOD PRESSURE: 86 MMHG | RESPIRATION RATE: 16 BRPM | HEIGHT: 59 IN | OXYGEN SATURATION: 98 % | TEMPERATURE: 98.7 F

## 2022-03-24 DIAGNOSIS — Z12.31 VISIT FOR SCREENING MAMMOGRAM: ICD-10-CM

## 2022-03-24 DIAGNOSIS — Z11.4 SCREENING FOR HIV (HUMAN IMMUNODEFICIENCY VIRUS): ICD-10-CM

## 2022-03-24 DIAGNOSIS — E78.5 HYPERLIPIDEMIA LDL GOAL <130: ICD-10-CM

## 2022-03-24 DIAGNOSIS — I10 BENIGN ESSENTIAL HYPERTENSION: ICD-10-CM

## 2022-03-24 DIAGNOSIS — F41.9 ANXIETY: ICD-10-CM

## 2022-03-24 LAB
ANION GAP SERPL CALCULATED.3IONS-SCNC: 3 MMOL/L (ref 3–14)
BUN SERPL-MCNC: 22 MG/DL (ref 7–30)
CALCIUM SERPL-MCNC: 9.4 MG/DL (ref 8.5–10.1)
CHLORIDE BLD-SCNC: 102 MMOL/L (ref 94–109)
CO2 SERPL-SCNC: 30 MMOL/L (ref 20–32)
CREAT SERPL-MCNC: 0.8 MG/DL (ref 0.52–1.04)
GFR SERPL CREATININE-BSD FRML MDRD: 83 ML/MIN/1.73M2
GLUCOSE BLD-MCNC: 101 MG/DL (ref 70–99)
POTASSIUM BLD-SCNC: 3.6 MMOL/L (ref 3.4–5.3)
SODIUM SERPL-SCNC: 135 MMOL/L (ref 133–144)

## 2022-03-24 PROCEDURE — 99214 OFFICE O/P EST MOD 30 MIN: CPT | Performed by: NURSE PRACTITIONER

## 2022-03-24 PROCEDURE — 87389 HIV-1 AG W/HIV-1&-2 AB AG IA: CPT | Performed by: NURSE PRACTITIONER

## 2022-03-24 PROCEDURE — 80048 BASIC METABOLIC PNL TOTAL CA: CPT | Performed by: NURSE PRACTITIONER

## 2022-03-24 PROCEDURE — 36415 COLL VENOUS BLD VENIPUNCTURE: CPT | Performed by: NURSE PRACTITIONER

## 2022-03-24 RX ORDER — METOPROLOL SUCCINATE 50 MG/1
50 TABLET, EXTENDED RELEASE ORAL DAILY
Qty: 90 TABLET | Refills: 3 | Status: SHIPPED | OUTPATIENT
Start: 2022-03-24 | End: 2023-04-21

## 2022-03-24 RX ORDER — HYDROCHLOROTHIAZIDE 12.5 MG/1
12.5 TABLET ORAL DAILY
Qty: 90 TABLET | Refills: 3 | Status: SHIPPED | OUTPATIENT
Start: 2022-03-24 | End: 2023-04-21

## 2022-03-24 RX ORDER — HYDROXYZINE HYDROCHLORIDE 25 MG/1
TABLET, FILM COATED ORAL
Qty: 60 TABLET | Refills: 3 | Status: SHIPPED | OUTPATIENT
Start: 2022-03-24

## 2022-03-24 RX ORDER — LISINOPRIL 40 MG/1
40 TABLET ORAL DAILY
Qty: 90 TABLET | Refills: 3 | Status: SHIPPED | OUTPATIENT
Start: 2022-03-24 | End: 2023-04-21

## 2022-03-24 RX ORDER — PRAVASTATIN SODIUM 20 MG
20 TABLET ORAL DAILY
Qty: 90 TABLET | Refills: 3 | Status: SHIPPED | OUTPATIENT
Start: 2022-03-24 | End: 2023-04-21

## 2022-03-24 ASSESSMENT — ANXIETY QUESTIONNAIRES
GAD7 TOTAL SCORE: 1
1. FEELING NERVOUS, ANXIOUS, OR ON EDGE: NOT AT ALL
6. BECOMING EASILY ANNOYED OR IRRITABLE: SEVERAL DAYS
7. FEELING AFRAID AS IF SOMETHING AWFUL MIGHT HAPPEN: NOT AT ALL
3. WORRYING TOO MUCH ABOUT DIFFERENT THINGS: NOT AT ALL
2. NOT BEING ABLE TO STOP OR CONTROL WORRYING: NOT AT ALL
5. BEING SO RESTLESS THAT IT IS HARD TO SIT STILL: NOT AT ALL
IF YOU CHECKED OFF ANY PROBLEMS ON THIS QUESTIONNAIRE, HOW DIFFICULT HAVE THESE PROBLEMS MADE IT FOR YOU TO DO YOUR WORK, TAKE CARE OF THINGS AT HOME, OR GET ALONG WITH OTHER PEOPLE: NOT DIFFICULT AT ALL

## 2022-03-24 ASSESSMENT — PATIENT HEALTH QUESTIONNAIRE - PHQ9: 5. POOR APPETITE OR OVEREATING: NOT AT ALL

## 2022-03-24 NOTE — LETTER
March 25, 2022      [place] NIMISHA Seth  [address] [facility] [place] [address]        Dear ,    We are writing to inform you of your test results.      HIV is negative.   Kidney function and electrolytes are normal     Resulted Orders   HIV Antigen Antibody Combo   Result Value Ref Range    HIV Antigen Antibody Combo Nonreactive Nonreactive      Comment:      HIV-1 p24 Ag & HIV-1/HIV-2 Ab Not Detected   Basic metabolic panel  (Ca, Cl, CO2, Creat, Gluc, K, Na, BUN)   Result Value Ref Range    Sodium 135 133 - 144 mmol/L    Potassium 3.6 3.4 - 5.3 mmol/L    Chloride 102 94 - 109 mmol/L    Carbon Dioxide (CO2) 30 20 - 32 mmol/L    Anion Gap 3 3 - 14 mmol/L    Urea Nitrogen 22 7 - 30 mg/dL    Creatinine 0.80 0.52 - 1.04 mg/dL    Calcium 9.4 8.5 - 10.1 mg/dL    Glucose 101 (H) 70 - 99 mg/dL    GFR Estimate 83 >60 mL/min/1.73m2      Comment:      Effective December 21, 2021 eGFRcr in adults is calculated using the 2021 CKD-EPI creatinine equation which includes age and gender (Ele et al., NEJM, DOI: 10.1056/WFJAjv1274010)       If you have any questions or concerns, please call the clinic at the number listed above.       Sincerely,      JUN Arreguin CNP

## 2022-03-24 NOTE — PROGRESS NOTES
Assessment & Plan     Benign essential hypertension  Well controlled.  - hydrochlorothiazide (HYDRODIURIL) 12.5 MG tablet; Take 1 tablet (12.5 mg) by mouth daily  - lisinopril (ZESTRIL) 40 MG tablet; Take 1 tablet (40 mg) by mouth daily  - metoprolol succinate ER (TOPROL-XL) 50 MG 24 hr tablet; Take 1 tablet (50 mg) by mouth daily  - Basic metabolic panel  (Ca, Cl, CO2, Creat, Gluc, K, Na, BUN); Future  - Basic metabolic panel  (Ca, Cl, CO2, Creat, Gluc, K, Na, BUN)    Hyperlipidemia LDL goal <130  Well controlled.  - pravastatin (PRAVACHOL) 20 MG tablet; Take 1 tablet (20 mg) by mouth daily    Anxiety  Well controlled.  - hydrOXYzine (ATARAX) 25 MG tablet; TAKE ONE TABLET BY MOUTH EVERY 8 HOURS AS NEEDED FOR ANXIETY    Visit for screening mammogram  - MA SCREENING DIGITAL BILAT - Future  (s+30); Future    Screening for HIV (human immunodeficiency virus)  - HIV Antigen Antibody Combo; Future  - HIV Antigen Antibody Combo        The risks, benefits and treatment options of prescribed medications or other treatments have been discussed with the patient. The patient verbalized their understanding and should call or follow up if no improvement or if they develop further problems.    JUN Arreguin [facility] is [facility] 62 year [facility] who presents for the following health issues     Refill of hydroxyzine and all other meds.     History of Present Illness       Hypertension: She presents for follow up of hypertension.  She does check blood pressure  regularly outside of the clinic. Outpatient blood pressures have not been over 140/90. She follows a low salt diet.     She eats 2-3 servings of fruits and vegetables daily.She consumes 1 sweetened beverage(s) daily.She exercises with enough effort to increase her heart rate 30 to 60 minutes per day.  She exercises with enough effort to increase her heart rate 3 or less days per week.   She is taking medications  "regularly.     Hyperlipidemia Follow-Up      Are you regularly taking any medication or supplement to lower your cholesterol?   Yes- pravastatin    Are you having muscle aches or other side effects that you think could be caused by your cholesterol lowering medication?  No     Anxiety Follow-Up    How are you doing with your anxiety since your last visit? Improved     Are you having other symptoms that might be associated with anxiety? No    Have you had a significant life event? No     Are you feeling depressed? No    Do you have any concerns with your use of alcohol or other drugs? No    Social History     Tobacco Use     Smoking status: Never Smoker     Smokeless tobacco: Never Used   Vaping Use     Vaping Use: Never used   Substance Use Topics     Alcohol use: No     Drug use: No     No flowsheet data found.  No flowsheet data found.        Review of Systems   Constitutional, HEENT, cardiovascular, pulmonary, gi and gu systems are negative, except as otherwise noted.      Objective    /86   Pulse 72   Temp 98.7  F (37.1  C) (Tympanic)   Resp 16   Ht 1.486 m (4' 10.5\")   Wt 64.9 kg (143 lb)   SpO2 98%   BMI 29.38 kg/m    Body mass index is 29.38 kg/m .  Physical Exam   GENERAL: healthy, alert and no distress  HENT: ear canals and TM's normal, nose and mouth without ulcers or lesions  NECK: no adenopathy, no asymmetry, masses, or scars and thyroid normal to palpation  RESP: lungs clear to auscultation - no rales, rhonchi or wheezes  CV: regular rate and rhythm, normal S1 S2, no S3 or S4, no murmur, click or rub, no peripheral edema and peripheral pulses strong  MS: no gross musculoskeletal defects noted, no edema                "

## 2022-03-25 LAB — HIV 1+2 AB+HIV1 P24 AG SERPL QL IA: NONREACTIVE

## 2022-03-25 ASSESSMENT — ANXIETY QUESTIONNAIRES: GAD7 TOTAL SCORE: 1

## 2022-05-19 ENCOUNTER — TELEPHONE (OUTPATIENT)
Dept: FAMILY MEDICINE | Facility: CLINIC | Age: 62
End: 2022-05-19
Payer: COMMERCIAL

## 2022-05-19 NOTE — TELEPHONE ENCOUNTER
Patient Quality Outreach    Patient is due for the following:   Colon Cancer Screening -  FIT  Breast Cancer Screening - Mammogram  Cervical Cancer Screening - PAP Needed    NEXT STEPS:   Schedule a yearly physical    Type of outreach:    Sent letter.      Questions for provider review:    None     Britt Mott, CMA

## 2022-05-19 NOTE — LETTER
May 19, 2022      Flor Seth  [address] [facility] [place] [address]      Your healthcare team cares about your health. To provide you with the best care,   we have reviewed your chart and based on our findings, we see that you are due to:     - BREAST CANCER SCREENING:  Schedule Annual Mammogram. Breast center scheduling number - [phone] or schedule in MyChart (self referall)  - CERVICAL CANCER SCREENING: Schedule a Cervical Cancer Screening, with Pap and wellness exam.   - COLON CANCER SCREENING:  Call or mychart the clinic to schedule your colonoscopy or schedule/ your FIT Test, or Cologuard test    If you have already completed these items, please contact the clinic via phone or   Mychart so your care team can review and update your records. Thank you for   choosing [facility] for your healthcare needs. For any questions,   concerns, or to schedule an appointment please contact the clinic.       Healthy Regards,      Your [facility] Care Team

## 2022-07-19 ENCOUNTER — OFFICE VISIT (OUTPATIENT)
Dept: FAMILY MEDICINE | Facility: CLINIC | Age: 62
End: 2022-07-19
Payer: COMMERCIAL

## 2022-07-19 ENCOUNTER — TELEPHONE (OUTPATIENT)
Dept: FAMILY MEDICINE | Facility: CLINIC | Age: 62
End: 2022-07-19

## 2022-07-19 ENCOUNTER — ANCILLARY PROCEDURE (OUTPATIENT)
Dept: GENERAL RADIOLOGY | Facility: CLINIC | Age: 62
End: 2022-07-19
Attending: NURSE PRACTITIONER
Payer: COMMERCIAL

## 2022-07-19 VITALS
DIASTOLIC BLOOD PRESSURE: 70 MMHG | HEIGHT: 59 IN | BODY MASS INDEX: 27.62 KG/M2 | HEART RATE: 84 BPM | SYSTOLIC BLOOD PRESSURE: 128 MMHG | TEMPERATURE: 97.4 F | WEIGHT: 137 LBS | RESPIRATION RATE: 14 BRPM | OXYGEN SATURATION: 99 %

## 2022-07-19 DIAGNOSIS — M54.2 NECK PAIN: ICD-10-CM

## 2022-07-19 DIAGNOSIS — R42 DIZZINESS: Primary | ICD-10-CM

## 2022-07-19 DIAGNOSIS — H61.22 IMPACTED CERUMEN OF LEFT EAR: ICD-10-CM

## 2022-07-19 PROCEDURE — 69209 REMOVE IMPACTED EAR WAX UNI: CPT | Mod: LT | Performed by: NURSE PRACTITIONER

## 2022-07-19 PROCEDURE — 99213 OFFICE O/P EST LOW 20 MIN: CPT | Mod: 25 | Performed by: NURSE PRACTITIONER

## 2022-07-19 PROCEDURE — 72040 X-RAY EXAM NECK SPINE 2-3 VW: CPT | Mod: TC | Performed by: RADIOLOGY

## 2022-07-19 NOTE — PROGRESS NOTES
Assessment & Plan     Dizziness  Patient reports dizziness only after neck adjustment at her chiropractor appointments.  Dizziness lasts for a few minutes after they set her up and then resolves.  She does not have dizziness at any other time; there is no associated symptoms.  Advised patient to discontinue these neck adjustments given dizziness.  Recommend carotid ultrasound.  - US Carotid Bilateral; Future    Neck pain  Recommend x-rays due to neck pain.  Patient states that neck pain has resolved for now after the chiropractic adjustments.  If patient has a recurrence of pain, I will order physical therapy.  - XR Cervical Spine 2/3 Views; Future    Impacted cerumen of left ear  Cleared after irrigation by CMA.  - ID REMOVAL IMPACTED CERUMEN IRRIGATION/LVG UNILAT      Patient Instructions   Xrays today of your neck.  Schedule an ultrasound of your carotid arteries: [phone]      If neck adjustments with your chiropractor are causing dizziness, then I recommend that you stop doing the adjustments.      If your neck starts to hurt again, let me know and I will order physical therapy for you.      The risks, benefits and treatment options of prescribed medications or other treatments have been discussed with the patient. The patient verbalized their understanding and should call or follow up if no improvement or if they develop further problems.    JUN Arreguin CNP  [facility]            Oswaldo Ray is a 62 year old, presenting for the following health issues:  Dizziness      HPI     Dizziness  Onset/Duration: Noticing she is getting dizziness at the chiropractor, happened about a couple months ago.   Description:   Do you feel faint: No  Does it feel like the surroundings (bed, room) are moving: No  Unsteady/off balance: YES  Have you passed out or fallen: No  Intensity: mild  Progression of Symptoms: same  Accompanying Signs & Symptoms:  Heart palpitations or chest  "pain: No  Nausea, vomiting: No  Weakness or lack of coordination in arms or legs: No  Vision or speech changes: YES- black  Numbness or tingling: No  Ringing in ears (Tinnitus): No, hears a crack in right ear   Hearing Loss: No  History:   Head trauma/concussion history: No  Previous similar symptoms: No  Recent bleeding history: No  Any new medications (BP?): No  Precipitating factors:   Worse with activity: YES- down to up at chiropractor   Worse with head movement: No  Alleviating factors:   Does staying in a fixed position give relief: YES  Therapies tried and outcome: None      Only happens at the chiropractor when he adjusts her neck.    Her neck pain has resolved after these adjustments.      Ear ringing - wondering if she has wax again.            Review of Systems   Constitutional, HEENT, cardiovascular, pulmonary, gi and gu systems are negative, except as otherwise noted.      Objective    /70   Pulse 84   Temp 97.4  F (36.3  C) (Tympanic)   Resp 14   Ht 1.486 m (4' 10.5\")   Wt 62.1 kg (137 lb)   SpO2 99%   BMI 28.15 kg/m    Body mass index is 28.15 kg/m .  Physical Exam   GENERAL: healthy, alert and no distress  EYES: Eyes grossly normal to inspection, PERRL and conjunctivae and sclerae normal  HENT: right ear: normal: no effusions, no erythema, normal landmarks and left ear: canal obstructed with impacted cerumen - cleared after irrigation by CMA  NECK: no adenopathy, no asymmetry, masses, or scars and thyroid normal to palpation  RESP: lungs clear to auscultation - no rales, rhonchi or wheezes  CV: regular rate and rhythm, normal S1 S2, no S3 or S4, no murmur, click or rub, no peripheral edema and peripheral pulses strong  MS: no gross musculoskeletal defects noted, no edema  NEURO: Normal strength and tone, mentation intact and speech normal  PSYCH: mentation appears normal, affect normal/bright                    .  ..  "

## 2022-11-15 NOTE — TELEPHONE ENCOUNTER
Panel Management Review          Composite cancer screening  Chart review shows that this patient is due/due soon for the following Mammogram and Colonoscopy  Summary:    Patient is due/failing the following:   COLONOSCOPY and MAMMOGRAM    Action needed:   Patient needs referral/order: for colonosocopy vs. FIT  Needs to schedule mammogram  Type of outreach:    Sent letter.    Questions for provider review:    None                                                                                                                                    TRINH LONGORIA                   Yes

## 2022-11-28 ENCOUNTER — IMMUNIZATION (OUTPATIENT)
Dept: FAMILY MEDICINE | Facility: CLINIC | Age: 62
End: 2022-11-28
Payer: COMMERCIAL

## 2022-11-28 DIAGNOSIS — Z23 NEED FOR PROPHYLACTIC VACCINATION AND INOCULATION AGAINST INFLUENZA: Primary | ICD-10-CM

## 2022-11-28 PROCEDURE — 90682 RIV4 VACC RECOMBINANT DNA IM: CPT

## 2022-11-28 PROCEDURE — 90471 IMMUNIZATION ADMIN: CPT

## 2022-11-28 NOTE — NURSING NOTE
Immunizations Administered     Name Date Dose VIS Date Route    Influenza Vaccine 50-64 or 18-64 w/egg allergy (Flublok) 11/28/22  2:02 PM 0.5 mL 08/06/2021, Given Today Intramuscular        After obtaining informed consent, the FLUBLOK immunization is given in the left deltoid. Patient verified all immunization questions prior to vaccination. Patient is advised to remain in clinic for 15 minutes to monitor for adverse reactions.    AZUL Stevens LPN on 11/28/2022 at 2:05 PM

## 2023-01-09 ENCOUNTER — TELEPHONE (OUTPATIENT)
Dept: FAMILY MEDICINE | Facility: CLINIC | Age: 63
End: 2023-01-09

## 2023-01-09 NOTE — LETTER
January 9, 2023      Flor Seth  [address] [place] [address]          Dear Flor,     Your team at [facility] cares about your health. We have reviewed your chart and based on our findings; we are making the following recommendations to better manage your health.     You are in particular need of attention regarding the following:     Schedule a primary care office visit with your provider for a Pap Smear to screen for Cervical Cancer.  Call or MyChart message your clinic to schedule a colonoscopy, schedule/ a FIT Test, or order a Cologuard test. If you are unsure what type of test you need, please call your clinic and speak to clinic staff.   Colon cancer is now the second leading cause of cancer-related deaths in the [place] for both men and women and there are over 130,000 new cases and 50,000 deaths per year from colon cancer. Colonoscopies can prevent 90-95% of these deaths. Problem lesions can be removed before they ever become cancer. This test is not only looking for cancer, but also getting rid of precancerous lesions.   If you are under/uninsured, we recommend you contact the [employer] Program.[employer] is a free colorectal cancer screening program that provides colonoscopies for eligible under/uninsured [place] men and women. If you are interested in receiving a free colonoscopy, please call [employer] at t [phone] (mention code [employer]) to see if you're eligible. Please have them send us the results.   PREVENTATIVE VISIT: Physical   Mammogram Is due. This can be scheduled by calling [phone]    If you have already completed these items, please contact the clinic via phone or   [employer] so your care team can review and update your records. Thank you for   choosing [facility] for your healthcare needs. For any questions,   concerns, or to schedule an appointment please contact our clinic.    Sincerely,    JUN Arreguin  CNP

## 2023-01-09 NOTE — TELEPHONE ENCOUNTER
Patient Quality Outreach    Patient is due for the following:   Colon Cancer Screening  Breast Cancer Screening - Mammogram  Cervical Cancer Screening - PAP Needed    Next Steps:   due  for colon screen , breast cancer screen ,cervical     Type of outreach:    Sent letter.      Questions for provider review:    None     Augustine Alejandro, CMA

## 2023-04-10 ENCOUNTER — TELEPHONE (OUTPATIENT)
Dept: FAMILY MEDICINE | Facility: CLINIC | Age: 63
End: 2023-04-10
Payer: COMMERCIAL

## 2023-04-10 NOTE — TELEPHONE ENCOUNTER
Patient Quality Outreach    Patient is due for the following:   Colon Cancer Screening  Breast Cancer Screening - Mammogram  Cervical Cancer Screening - PAP Needed  Physical Preventive Adult Physical    Next Steps:   Schedule a Adult Preventative    Type of outreach:    Sent letter.      Questions for provider review:    None     Augustine Alejandro, CMA

## 2023-04-10 NOTE — LETTER
April 10, 2023      Flor Seth  [address] [facility] [place] [address]        Dear Flor,       Your team at [facility] cares about your health. We have reviewed your chart and based on our findings; we are making the following recommendations to better manage your health.     You are in particular need of attention regarding the following:     Schedule Annual MAMMOGRAPHY. [facility] scheduling number is [phone] or schedule in [employer] (self referral).  Schedule a primary care office visit with your provider for a Pap Smear to screen for Cervical Cancer.  Call or MyChart message your clinic to schedule a colonoscopy, schedule/ a FIT Test, or order a Cologuard test. If you are unsure what type of test you need, please call your clinic and speak to clinic staff.   Colon cancer is now the second leading cause of cancer-related deaths in the [place] for both men and women and there are over 130,000 new cases and 50,000 deaths per year from colon cancer. Colonoscopies can prevent 90-95% of these deaths. Problem lesions can be removed before they ever become cancer. This test is not only looking for cancer, but also getting rid of precancerous lesions.   If you are under/uninsured, we recommend you contact the [employer] Program.[employer] is a free colorectal cancer screening program that provides colonoscopies for eligible under/uninsured [place] men and women. If you are interested in receiving a free colonoscopy, please call [employer] at t [phone] (mention code [employer]) to see if you're eligible. Please have them send us the results.   PREVENTATIVE VISIT: Physical    If you have already completed these items, please contact the clinic via phone or   [employer] so your care team can review and update your records. Thank you for   choosing [facility] for your healthcare needs. For any questions,   concerns, or to schedule an appointment please contact  our clinic.Y    Sincerely,  JUN Arreguin CNP

## 2023-04-21 DIAGNOSIS — I10 BENIGN ESSENTIAL HYPERTENSION: ICD-10-CM

## 2023-04-21 DIAGNOSIS — E78.5 HYPERLIPIDEMIA LDL GOAL <130: ICD-10-CM

## 2023-04-21 RX ORDER — HYDROCHLOROTHIAZIDE 12.5 MG/1
TABLET ORAL
Qty: 90 TABLET | Refills: 0 | Status: SHIPPED | OUTPATIENT
Start: 2023-04-21 | End: 2023-07-12

## 2023-04-21 RX ORDER — PRAVASTATIN SODIUM 20 MG
TABLET ORAL
Qty: 90 TABLET | Refills: 0 | Status: SHIPPED | OUTPATIENT
Start: 2023-04-21 | End: 2023-07-12

## 2023-04-21 RX ORDER — LISINOPRIL 40 MG/1
40 TABLET ORAL DAILY
Qty: 90 TABLET | Refills: 0 | Status: SHIPPED | OUTPATIENT
Start: 2023-04-21 | End: 2023-07-12

## 2023-04-21 RX ORDER — METOPROLOL SUCCINATE 50 MG/1
50 TABLET, EXTENDED RELEASE ORAL DAILY
Qty: 90 TABLET | Refills: 0 | Status: SHIPPED | OUTPATIENT
Start: 2023-04-21 | End: 2023-07-12

## 2023-04-21 NOTE — TELEPHONE ENCOUNTER
Routing refill request to provider for review/approval because:  Labs not current:  Cr, K+ and NA    Thank you    Saskia HICKS RN

## 2023-04-21 NOTE — TELEPHONE ENCOUNTER
Looks like all of her meds are due for refill.  Sent in a 90-day supply.  She is due for an in clinic appointment with me and lab work.  Please notify patient to schedule.  Xochilt Ro, CNP

## 2023-05-12 DIAGNOSIS — I10 BENIGN ESSENTIAL HYPERTENSION: ICD-10-CM

## 2023-05-12 RX ORDER — METOPROLOL SUCCINATE 50 MG/1
TABLET, EXTENDED RELEASE ORAL
Qty: 90 TABLET | Refills: 3 | OUTPATIENT
Start: 2023-05-12

## 2023-05-12 NOTE — TELEPHONE ENCOUNTER
Per last fill notes appt needed please call to schedule and let the team know if consideration of a bridge fill to scheduled appt is needed.     Helical Rim Advancement Flap Text: The defect edges were debeveled with a #15 blade scalpel.  Given the location of the defect and the proximity to free margins (helical rim) a double helical rim advancement flap was deemed most appropriate.  Using a sterile surgical marker, the appropriate advancement flaps were drawn incorporating the defect and placing the expected incisions between the helical rim and antihelix where possible.  The area thus outlined was incised through and through with a #15 scalpel blade.  With a skin hook and iris scissors, the flaps were gently and sharply undermined and freed up.

## 2023-05-12 NOTE — TELEPHONE ENCOUNTER
Patient's voice mailbox not set up. Unable to leave a message. Janel Iqbal on 5/12/2023 at 12:46 PM

## 2023-07-12 ENCOUNTER — ANCILLARY PROCEDURE (OUTPATIENT)
Dept: GENERAL RADIOLOGY | Facility: CLINIC | Age: 63
End: 2023-07-12
Attending: NURSE PRACTITIONER
Payer: COMMERCIAL

## 2023-07-12 ENCOUNTER — OFFICE VISIT (OUTPATIENT)
Dept: FAMILY MEDICINE | Facility: CLINIC | Age: 63
End: 2023-07-12
Payer: COMMERCIAL

## 2023-07-12 VITALS
HEIGHT: 59 IN | RESPIRATION RATE: 12 BRPM | BODY MASS INDEX: 27.82 KG/M2 | TEMPERATURE: 98.1 F | DIASTOLIC BLOOD PRESSURE: 70 MMHG | OXYGEN SATURATION: 99 % | SYSTOLIC BLOOD PRESSURE: 128 MMHG | HEART RATE: 71 BPM | WEIGHT: 138 LBS

## 2023-07-12 DIAGNOSIS — M25.561 CHRONIC PAIN OF BOTH KNEES: ICD-10-CM

## 2023-07-12 DIAGNOSIS — M25.562 CHRONIC PAIN OF BOTH KNEES: ICD-10-CM

## 2023-07-12 DIAGNOSIS — G89.29 CHRONIC PAIN OF BOTH KNEES: ICD-10-CM

## 2023-07-12 DIAGNOSIS — G89.29 CHRONIC RIGHT SHOULDER PAIN: ICD-10-CM

## 2023-07-12 DIAGNOSIS — Z12.4 CERVICAL CANCER SCREENING: ICD-10-CM

## 2023-07-12 DIAGNOSIS — Z01.419 ENCOUNTER FOR GYNECOLOGICAL EXAMINATION WITHOUT ABNORMAL FINDING: Primary | ICD-10-CM

## 2023-07-12 DIAGNOSIS — R73.01 IMPAIRED FASTING GLUCOSE: Primary | ICD-10-CM

## 2023-07-12 DIAGNOSIS — Z12.31 VISIT FOR SCREENING MAMMOGRAM: ICD-10-CM

## 2023-07-12 DIAGNOSIS — E78.5 HYPERLIPIDEMIA LDL GOAL <130: ICD-10-CM

## 2023-07-12 DIAGNOSIS — Z12.11 SCREEN FOR COLON CANCER: ICD-10-CM

## 2023-07-12 DIAGNOSIS — M25.511 CHRONIC RIGHT SHOULDER PAIN: ICD-10-CM

## 2023-07-12 DIAGNOSIS — I10 BENIGN ESSENTIAL HYPERTENSION: ICD-10-CM

## 2023-07-12 LAB
ANION GAP SERPL CALCULATED.3IONS-SCNC: 13 MMOL/L (ref 7–15)
BUN SERPL-MCNC: 19.9 MG/DL (ref 8–23)
CALCIUM SERPL-MCNC: 10.1 MG/DL (ref 8.8–10.2)
CHLORIDE SERPL-SCNC: 100 MMOL/L (ref 98–107)
CHOLEST SERPL-MCNC: 205 MG/DL
CREAT SERPL-MCNC: 0.85 MG/DL (ref 0.51–0.95)
DEPRECATED HCO3 PLAS-SCNC: 26 MMOL/L (ref 22–29)
GFR SERPL CREATININE-BSD FRML MDRD: 77 ML/MIN/1.73M2
GLUCOSE SERPL-MCNC: 122 MG/DL (ref 70–99)
HDLC SERPL-MCNC: 62 MG/DL
LDLC SERPL CALC-MCNC: 106 MG/DL
NONHDLC SERPL-MCNC: 143 MG/DL
POTASSIUM SERPL-SCNC: 3.7 MMOL/L (ref 3.4–5.3)
SODIUM SERPL-SCNC: 139 MMOL/L (ref 136–145)
TRIGL SERPL-MCNC: 184 MG/DL

## 2023-07-12 PROCEDURE — 99396 PREV VISIT EST AGE 40-64: CPT | Performed by: NURSE PRACTITIONER

## 2023-07-12 PROCEDURE — 80048 BASIC METABOLIC PNL TOTAL CA: CPT | Performed by: NURSE PRACTITIONER

## 2023-07-12 PROCEDURE — 36415 COLL VENOUS BLD VENIPUNCTURE: CPT | Performed by: NURSE PRACTITIONER

## 2023-07-12 PROCEDURE — 87624 HPV HI-RISK TYP POOLED RSLT: CPT | Performed by: NURSE PRACTITIONER

## 2023-07-12 PROCEDURE — 99213 OFFICE O/P EST LOW 20 MIN: CPT | Mod: 25 | Performed by: NURSE PRACTITIONER

## 2023-07-12 PROCEDURE — 80061 LIPID PANEL: CPT | Performed by: NURSE PRACTITIONER

## 2023-07-12 PROCEDURE — 73030 X-RAY EXAM OF SHOULDER: CPT | Mod: TC | Performed by: RADIOLOGY

## 2023-07-12 PROCEDURE — 73560 X-RAY EXAM OF KNEE 1 OR 2: CPT | Mod: TC | Performed by: RADIOLOGY

## 2023-07-12 PROCEDURE — G0145 SCR C/V CYTO,THINLAYER,RESCR: HCPCS | Performed by: NURSE PRACTITIONER

## 2023-07-12 RX ORDER — PRAVASTATIN SODIUM 20 MG
20 TABLET ORAL DAILY
Qty: 90 TABLET | Refills: 3 | Status: SHIPPED | OUTPATIENT
Start: 2023-07-12 | End: 2024-07-19

## 2023-07-12 RX ORDER — METOPROLOL SUCCINATE 50 MG/1
50 TABLET, EXTENDED RELEASE ORAL DAILY
Qty: 90 TABLET | Refills: 3 | Status: SHIPPED | OUTPATIENT
Start: 2023-07-12 | End: 2024-07-19

## 2023-07-12 RX ORDER — HYDROCHLOROTHIAZIDE 12.5 MG/1
12.5 TABLET ORAL DAILY
Qty: 90 TABLET | Refills: 3 | Status: SHIPPED | OUTPATIENT
Start: 2023-07-12 | End: 2024-07-19

## 2023-07-12 RX ORDER — LISINOPRIL 40 MG/1
40 TABLET ORAL DAILY
Qty: 90 TABLET | Refills: 3 | Status: SHIPPED | OUTPATIENT
Start: 2023-07-12 | End: 2024-07-19

## 2023-07-12 ASSESSMENT — ENCOUNTER SYMPTOMS
PARESTHESIAS: 0
SHORTNESS OF BREATH: 0
CHILLS: 0
ABDOMINAL PAIN: 0
WEAKNESS: 0
SORE THROAT: 0
BREAST MASS: 0
FEVER: 0
HEMATOCHEZIA: 0
NAUSEA: 0
DYSURIA: 0
HEADACHES: 0
JOINT SWELLING: 1
FREQUENCY: 0
CONSTIPATION: 0
HEMATURIA: 0
EYE PAIN: 0
DIARRHEA: 0
PALPITATIONS: 0
MYALGIAS: 1
DIZZINESS: 0
ARTHRALGIAS: 1
NERVOUS/ANXIOUS: 1
COUGH: 0
HEARTBURN: 0

## 2023-07-12 ASSESSMENT — PAIN SCALES - GENERAL: PAINLEVEL: MODERATE PAIN (4)

## 2023-07-12 NOTE — PROGRESS NOTES
SUBJECTIVE:   CC: Flor is an 63 year old who presents for preventive health visit.       7/12/2023     9:14 AM   Additional Questions   Roomed by Britt GUZMAN         7/12/2023     9:14 AM   Patient Reported Additional Medications   Patient reports taking the following new medications [employer] over the counter joint support.     Healthy Habits:     Getting at least 3 servings of Calcium per day:  Yes    Bi-annual eye exam:  NO    Dental care twice a year:  NO    Sleep apnea or symptoms of sleep apnea:  None    Diet:  Regular (no restrictions)    Frequency of exercise:  None    Taking medications regularly:  Yes    Medication side effects:  None    Additional concerns today:  Yes      Today's PHQ-2 Score:       7/12/2023     9:11 AM   PHQ-2 ( 1999 [employer])   Q1: Little interest or pleasure in doing things 0   Q2: Feeling down, depressed or hopeless 0   PHQ-2 Score 0   Q1: Little interest or pleasure in doing things Not at all   Q2: Feeling down, depressed or hopeless Not at all   PHQ-2 Score 0         PROBLEMS TO ADD ON...    Right shoulder pain- chronic for years  Decreased ROM-   Uses Ice  No previous evaluation      Bilateral knee pain  Right knee stiff- gets better with activity  Left knee has swelling posterior      HTN follow up  Checks at home  Most the time <140/90  Follows a low salt diet    Lipids follow up  No muscle pain  Fasting this morning for blood work        Have you ever done Advance Care Planning? (For example, a Health Directive, POLST, or a discussion with a medical provider or your loved ones about your wishes): No, advance care planning information given to patient to review.  Advanced care planning was discussed at today's visit.    Social History     Tobacco Use     Smoking status: Never     Smokeless tobacco: Never   Substance Use Topics     Alcohol use: No             7/12/2023     9:14 AM   Alcohol Use   Prescreen: >3 drinks/day or >7 drinks/week? Not Applicable     Reviewed orders with  "patient.  Reviewed health maintenance and updated orders accordingly - Yes      Breast Cancer Screening:    FHS-7:        No data to display                Mammogram Screening: Recommended annual mammography  Pertinent mammograms are reviewed under the imaging tab.    History of abnormal Pap smear: NO - age 30-65 PAP every 5 years with negative HPV co-testing recommended      Latest Ref Rng & Units 11/27/2017     8:15 AM 11/27/2017     8:14 AM 6/5/2012     9:30 AM   PAP / HPV   PAP (Historical)   NIL  NIL    HPV 16 DNA NEG^Negative Negative      HPV 18 DNA NEG^Negative Negative      Other HR HPV NEG^Negative Negative        Reviewed and updated as needed this visit by clinical staff   Tobacco  Allergies  Meds  Problems  Med Hx  Surg Hx  Fam Hx          Reviewed and updated as needed this visit by Provider   Tobacco  Allergies  Meds  Problems  Med Hx  Surg Hx  Fam Hx             Review of Systems   Constitutional: Negative for chills and fever.   HENT: Negative for congestion, ear pain, hearing loss and sore throat.    Eyes: Negative for pain and visual disturbance.   Respiratory: Negative for cough and shortness of breath.    Cardiovascular: Negative for chest pain, palpitations and peripheral edema.   Gastrointestinal: Negative for abdominal pain, constipation, diarrhea, heartburn, hematochezia and nausea.   Breasts:  Negative for tenderness, breast mass and discharge.   Genitourinary: Negative for dysuria, frequency, genital sores, hematuria, pelvic pain, urgency, vaginal bleeding and vaginal discharge.   Musculoskeletal: Positive for arthralgias, joint swelling and myalgias.   Skin: Negative for rash.   Neurological: Negative for dizziness, weakness, headaches and paresthesias.   Psychiatric/Behavioral: Negative for mood changes. The patient is nervous/anxious.           OBJECTIVE:   /70   Pulse 71   Temp 98.1  F (36.7  C) (Tympanic)   Resp 12   Ht 1.486 m (4' 10.5\")   Wt 62.6 kg (138 lb) "   LMP  (LMP Unknown)   SpO2 99%   BMI 28.35 kg/m    Physical Exam  GENERAL APPEARANCE: healthy, alert and no distress  EYES: Eyes grossly normal to inspection, PERRL and conjunctivae and sclerae normal  HENT: ear canals and TM's normal, nose and mouth without ulcers or lesions, oropharynx clear and oral mucous membranes moist  NECK: no adenopathy, no asymmetry, masses, or scars and thyroid normal to palpation  RESP: lungs clear to auscultation - no rales, rhonchi or wheezes  BREAST: normal without masses, tenderness or nipple discharge and no palpable axillary masses or adenopathy  CV: regular rate and rhythm, normal S1 S2, no S3 or S4, no murmur, click or rub, no peripheral edema and peripheral pulses strong  ABDOMEN: soft, nontender, no hepatosplenomegaly, no masses and bowel sounds normal   (female): normal female external genitalia, normal urethral meatus, vaginal mucosal atrophy noted, normal cervix, adnexae, and uterus without masses or abnormal discharge  MS: no musculoskeletal defects are noted and gait is age appropriate without ataxia  SKIN: no suspicious lesions or rashes  NEURO: Normal strength and tone, sensory exam grossly normal, mentation intact and speech normal  PSYCH: mentation appears normal and affect normal/bright        ASSESSMENT/PLAN:       ICD-10-CM    1. Encounter for gynecological examination without abnormal finding  Z01.419       2. Hyperlipidemia LDL goal <130  E78.5 Well controlled.  pravastatin (PRAVACHOL) 20 MG tablet     Lipid panel reflex to direct LDL Fasting     OFFICE/OUTPT VISIT,EST,LEVL III     Lipid panel reflex to direct LDL Fasting      3. Benign essential hypertension  I10 Well controlled.  metoprolol succinate ER (TOPROL XL) 50 MG 24 hr tablet     lisinopril (ZESTRIL) 40 MG tablet     hydrochlorothiazide (HYDRODIURIL) 12.5 MG tablet     Basic metabolic panel  (Ca, Cl, CO2, Creat, Gluc, K, Na, BUN)     OFFICE/OUTPT VISIT,EST,LEVL III     Basic metabolic panel  (Ca,  "Cl, CO2, Creat, Gluc, K, Na, BUN)      4. Chronic right shoulder pain  M25.511 XR Shoulder Right 2 Views    G89.29 Orthopedic  Referral     OFFICE/OUTPT VISIT,EST,LEVL III      5. Chronic pain of both knees  M25.561 XR Knee AP Standing Bilateral    M25.562 XR Knee Bilateral 1/2 Views    G89.29 Orthopedic  Referral     OFFICE/OUTPT VISIT,EST,LEVL III      6. Cervical cancer screening  Z12.4 Pap Screen with HPV - recommended age 30 - 65 years      7. Visit for screening mammogram  Z12.31 MA SCREENING DIGITAL BILAT - Future  (s+30)      8. Screen for colon cancer  Z12.11 Fecal colorectal cancer screen FIT - Future (S+30)          Patient has been advised of split billing requirements and indicates understanding: Yes by AFR and CMA      COUNSELING:  Reviewed preventive health counseling, as reflected in patient instructions      BMI:   Estimated body mass index is 28.35 kg/m  as calculated from the following:    Height as of this encounter: 1.486 m (4' 10.5\").    Weight as of this encounter: 62.6 kg (138 lb).   Weight management plan: Discussed healthy diet and exercise guidelines      She reports that she has never smoked. She has never used smokeless tobacco.      The risks, benefits and treatment options of prescribed medications or other treatments have been discussed with the patient. The patient verbalized their understanding and should call or follow up if no improvement or if they develop further problems.    JUN Arreguin [facility]  "

## 2023-07-13 ENCOUNTER — LAB (OUTPATIENT)
Dept: LAB | Facility: CLINIC | Age: 63
End: 2023-07-13
Payer: COMMERCIAL

## 2023-07-13 DIAGNOSIS — R73.01 IMPAIRED FASTING GLUCOSE: ICD-10-CM

## 2023-07-13 LAB — HBA1C MFR BLD: 6.5 % (ref 0–5.6)

## 2023-07-13 PROCEDURE — 36415 COLL VENOUS BLD VENIPUNCTURE: CPT

## 2023-07-13 PROCEDURE — 83036 HEMOGLOBIN GLYCOSYLATED A1C: CPT

## 2023-07-16 LAB
BKR LAB AP GYN ADEQUACY: NORMAL
BKR LAB AP GYN INTERPRETATION: NORMAL
BKR LAB AP HPV REFLEX: NORMAL
BKR LAB AP PREVIOUS ABNORMAL: NORMAL
PATH REPORT.COMMENTS IMP SPEC: NORMAL
PATH REPORT.COMMENTS IMP SPEC: NORMAL
PATH REPORT.RELEVANT HX SPEC: NORMAL

## 2023-07-18 LAB
HUMAN PAPILLOMA VIRUS 16 DNA: NEGATIVE
HUMAN PAPILLOMA VIRUS 18 DNA: NEGATIVE
HUMAN PAPILLOMA VIRUS FINAL DIAGNOSIS: NORMAL
HUMAN PAPILLOMA VIRUS OTHER HR: NEGATIVE

## 2023-07-25 ENCOUNTER — OFFICE VISIT (OUTPATIENT)
Dept: FAMILY MEDICINE | Facility: CLINIC | Age: 63
End: 2023-07-25
Payer: COMMERCIAL

## 2023-07-25 VITALS
TEMPERATURE: 97.4 F | SYSTOLIC BLOOD PRESSURE: 124 MMHG | HEIGHT: 59 IN | RESPIRATION RATE: 14 BRPM | WEIGHT: 136 LBS | DIASTOLIC BLOOD PRESSURE: 70 MMHG | HEART RATE: 64 BPM | BODY MASS INDEX: 27.42 KG/M2 | OXYGEN SATURATION: 99 %

## 2023-07-25 DIAGNOSIS — E11.9 TYPE 2 DIABETES MELLITUS WITHOUT COMPLICATION, WITHOUT LONG-TERM CURRENT USE OF INSULIN (H): Primary | ICD-10-CM

## 2023-07-25 LAB
CREAT UR-MCNC: 78.9 MG/DL
MICROALBUMIN UR-MCNC: <12 MG/L
MICROALBUMIN/CREAT UR: NORMAL MG/G{CREAT}

## 2023-07-25 PROCEDURE — 82043 UR ALBUMIN QUANTITATIVE: CPT | Performed by: NURSE PRACTITIONER

## 2023-07-25 PROCEDURE — 99214 OFFICE O/P EST MOD 30 MIN: CPT | Performed by: NURSE PRACTITIONER

## 2023-07-25 PROCEDURE — 82570 ASSAY OF URINE CREATININE: CPT | Performed by: NURSE PRACTITIONER

## 2023-07-25 RX ORDER — METFORMIN HCL 500 MG
500 TABLET, EXTENDED RELEASE 24 HR ORAL
Qty: 90 TABLET | Refills: 3 | Status: SHIPPED | OUTPATIENT
Start: 2023-07-25 | End: 2024-07-19

## 2023-07-25 ASSESSMENT — PAIN SCALES - GENERAL: PAINLEVEL: NO PAIN (0)

## 2023-07-25 NOTE — PROGRESS NOTES
Assessment & Plan     Type 2 diabetes mellitus without complication, without long-term current use of insulin (H)  New diagnosis.  Discussed type 2 diabetes.  Discussed initial treatment options.  Recommend starting metformin 500 mg once daily.  Recommended diabetes education and referral placed.  Urine albumin obtained today.  Blood pressures are well controlled.  Patient is on a statin.  She also takes a baby aspirin daily.  Recommended diabetic eye exam.  Patient will follow-up with me again for A1c recheck in 6 months.  - metFORMIN (GLUCOPHAGE XR) 500 MG 24 hr tablet; Take 1 tablet (500 mg) by mouth daily (with dinner)  - [facility] Adult Diabetes Educator Referral; Future  - Albumin Random Urine Quantitative with Creat Ratio    The risks, benefits and treatment options of prescribed medications or other treatments have been discussed with the patient. The patient verbalized their understanding and should call or follow up if no improvement or if they develop further problems.  JUN [facility]reguni [facility] is a 63 year old, presenting for the following health issues:  Results (Go over A1c from 07/13/23. )        7/25/2023     7:37 AM   Additional Questions   Roomed by Lauren CROCKETT   Accompanied by self         7/25/2023     7:37 AM   Patient Reported Additional Medications   Patient reports taking the following new medications no new meds     HPI     Would like to go over lab results from 07/13/23, A1c was elevated.   Blood sugar was noted to be elevated at appointment last week.  Patient came in for an A1c check and results were 6.5%.  Diabetes is a new diagnosis for this patient.  Patient states that she is already adjusted some things in her diet and has lost a couple pounds.      Lab Results   Component Value Date    A1C 6.5 07/13/2023    A1C 5.0 07/19/2006       Wt Readings from Last 4 Encounters:   07/25/23 61.7 kg (136 lb)   07/12/23 62.6 kg (138 lb)  "  07/19/22 62.1 kg (137 lb)   03/24/22 64.9 kg (143 lb)               Review of Systems   Constitutional, HEENT, cardiovascular, pulmonary, gi and gu systems are negative, except as otherwise noted.      Objective    /70   Pulse 64   Temp 97.4  F (36.3  C) (Tympanic)   Resp 14   Ht 1.486 m (4' 10.5\")   Wt 61.7 kg (136 lb)   LMP  (LMP Unknown)   SpO2 99%   BMI 27.94 kg/m    Body mass index is 27.94 kg/m .  Physical Exam   GENERAL: healthy, alert and no distress  RESP: lungs clear to auscultation - no rales, rhonchi or wheezes  CV: regular rate and rhythm, normal S1 S2, no S3 or S4, no murmur, click or rub, no peripheral edema and peripheral pulses strong  Diabetic foot exam: normal DP and PT pulses, no trophic changes or ulcerative lesions, normal sensory exam, and normal monofilament exam                      "

## 2023-07-25 NOTE — PATIENT INSTRUCTIONS
Schedule diabetes education - someone will call you with an appointment.      Schedule a diabetic eye exam.      Start metformin one tablet with dinner.    Follow up with me in 6 months.

## 2023-07-27 ENCOUNTER — HOSPITAL ENCOUNTER (OUTPATIENT)
Dept: MAMMOGRAPHY | Facility: CLINIC | Age: 63
Discharge: HOME | End: 2023-07-27
Attending: NURSE PRACTITIONER | Admitting: NURSE PRACTITIONER
Payer: COMMERCIAL

## 2023-07-27 DIAGNOSIS — Z12.31 VISIT FOR SCREENING MAMMOGRAM: ICD-10-CM

## 2023-07-27 PROCEDURE — 77067 SCR MAMMO BI INCL CAD: CPT

## 2023-07-28 ENCOUNTER — OFFICE VISIT (OUTPATIENT)
Dept: ORTHOPEDICS | Facility: CLINIC | Age: 63
End: 2023-07-28
Attending: NURSE PRACTITIONER
Payer: COMMERCIAL

## 2023-07-28 VITALS
SYSTOLIC BLOOD PRESSURE: 127 MMHG | WEIGHT: 136 LBS | HEART RATE: 71 BPM | BODY MASS INDEX: 27.94 KG/M2 | DIASTOLIC BLOOD PRESSURE: 81 MMHG

## 2023-07-28 DIAGNOSIS — M71.22 BAKER'S CYST OF KNEE, LEFT: ICD-10-CM

## 2023-07-28 DIAGNOSIS — M17.12 ARTHRITIS OF LEFT KNEE: Primary | ICD-10-CM

## 2023-07-28 PROCEDURE — 99244 OFF/OP CNSLTJ NEW/EST MOD 40: CPT | Performed by: PEDIATRICS

## 2023-07-28 NOTE — LETTER
7/28/2023         RE: Flor Seth  [address] [place] [address]        Dear Colleague,    Thank you for referring your patient, Flor Seth, to the [facility] [place]. Please see a copy of my visit note below.    ASSESSMENT & PLAN    Flor was seen today for pain.    Diagnoses and all orders for this visit:    Arthritis of left knee    Baker's cyst of knee, left  -     Orthopedic  Referral      This issue is acute and Unchanged.      Symptoms likely from a Baker's cyst. I reviewed images and discussed the diagnosis - A baker's cyst is a collection of fluid in the posterior knee, usually from irritation of knee joint pathology, knee arthritis in this case.  I discussed the natural course - Most cysts will resolve on their own with supportive care including rest, ice, compression.  A cyst may rupture causing calf pain.  I discussed possible treatment including supportive care and physical therapy for knee strengthening, trial of intra-articular steroid injection and lastly, US guided evaluation and possible aspiration/injection of the cyst itself.  Imaging is also an option, though not typically necessary.  I discussed the possibility of recurrence.    Plan:  - Today's Plan of Care:  Continue with relative rest and activity modification, Ice, Compression, and Elevation.  Can apply ice 10-15 minutes 3-4 times per day as needed. OTC medications as needed.  Discussed compression  Home Exercise Program    -We also discussed other future treatment options:  Knee joint injection  US guided Cyst aspiration    Follow Up: as needed    Concerning signs and symptoms were reviewed and all questions were answered at this time.    Thanks for the opportunity to participate in the care of this patient, I will keep you updated on their progress.    CC: Xochilt Patel MD [facility]  Sports Medicine Physician  [facility]  "Orthopedics    -----  Chief Complaint   Patient presents with     Left Knee - Pain       SUBJECTIVE  Flor Seth is a/an 63 year old female who is seen in consultation at the request of  Xochilt Ro C.N.P. for evaluation of left knee.    The patient is seen by themselves.    Onset: 2 month(s) ago. Reports insidious onset without acute precipitating event. Noticed posterior knee swelling.  Location of Pain: left knee; no tenderness, pain, it is just \"there\"  Worsened by: not sure  Better with: nothing   Treatments tried: no treatment tried to date and previous imaging (xray)  Associated symptoms: swelling    Orthopedic/Surgical history: NO  Social History/Occupation: working at [employer], active for her job      REVIEW OF SYSTEMS:  Review of Systems    OBJECTIVE:  /81   Pulse 71   Wt 61.7 kg (136 lb)   LMP  (LMP Unknown)   BMI 27.94 kg/m     General: healthy, alert and in no distress  Skin: no suspicious lesions or rash.  CV: distal perfusion intact  Resp: normal respiratory effort without conversational dyspnea   Psych: normal mood and affect  Gait: NORMAL  Neuro: Normal light sensory exam of lower extremity    Bilateral Knee exam    Inspection:      posterior knee swelling left    Patella:      Crepitus noted in the patellofemoral joint bilateral    Tender:      none currently    Non Tender:      remainder of knee area bilateral    Knee ROM:      Full active and passive ROM with flexion and extension bilateral    Hip ROM:     Full active and passive ROM bilateral    Strength:      5/5 with knee extension bilateral    Special Tests:     neg (-) Chelle left       neg (-) anterior drawer left       neg (-) posterior drawer left       neg (-) varus at 0 deg and 30 deg left       neg (-) valgus at 0 deg and 30 deg left    Gait:      normal    Neurovascular:      2+ peripheral pulses bilaterally and brisk capillary refill       sensation grossly intact    RADIOLOGY:  Final results and radiologist's " interpretation, available in the [facility] health record.  Images were reviewed with the patient in the office today.  My personal interpretation of the performed imaging:    Reviewed Knee XR 2 view of bilateral knees -moderate to severe degenerative changes on the left, worse in the patellofemoral compartment    Review of the result(s) of each unique test - XR             Again, thank you for allowing me to participate in the care of your patient.        Sincerely,        Suri Patel MD

## 2023-07-28 NOTE — PATIENT INSTRUCTIONS
Symptoms likely from a Baker's cyst. I reviewed images and discussed the diagnosis - A baker's cyst is a collection of fluid in the posterior knee, usually from irritation of knee joint pathology, knee arthritis in this case.  I discussed the natural course - Most cysts will resolve on their own with supportive care including rest, ice, compression.  A cyst may rupture causing calf pain.  I discussed possible treatment including supportive care and physical therapy for knee strengthening, trial of intra-articular steroid injection and lastly, US guided evaluation and possible aspiration/injection of the cyst itself.  Imaging is also an option, though not typically necessary.  I discussed the possibility of recurrence.    Plan:  - Today's Plan of Care:  Continue with relative rest and activity modification, Ice, Compression, and Elevation.  Can apply ice 10-15 minutes 3-4 times per day as needed. OTC medications as needed.  Discussed compression  Home Exercise Program    -We also discussed other future treatment options:  Knee joint injection  US guided Cyst aspiration    Follow Up: as needed    If you have any further questions for your physician or physician s care team you can call [phone] and use option 3 to leave a voice message.

## 2023-07-28 NOTE — PROGRESS NOTES
ASSESSMENT & PLAN    Flor was seen today for pain.    Diagnoses and all orders for this visit:    Arthritis of left knee    Baker's cyst of knee, left  -     Orthopedic  Referral      This issue is acute and Unchanged.      Symptoms likely from a Baker's cyst. I reviewed images and discussed the diagnosis - A baker's cyst is a collection of fluid in the posterior knee, usually from irritation of knee joint pathology, knee arthritis in this case.  I discussed the natural course - Most cysts will resolve on their own with supportive care including rest, ice, compression.  A cyst may rupture causing calf pain.  I discussed possible treatment including supportive care and physical therapy for knee strengthening, trial of intra-articular steroid injection and lastly, US guided evaluation and possible aspiration/injection of the cyst itself.  Imaging is also an option, though not typically necessary.  I discussed the possibility of recurrence.    Plan:  - Today's Plan of Care:  Continue with relative rest and activity modification, Ice, Compression, and Elevation.  Can apply ice 10-15 minutes 3-4 times per day as needed. OTC medications as needed.  Discussed compression  Home Exercise Program    -We also discussed other future treatment options:  Knee joint injection  US guided Cyst aspiration    Follow Up: as needed    Concerning signs and symptoms were reviewed and all questions were answered at this time.    Thanks for the opportunity to participate in the care of this patient, I will keep you updated on their progress.    CC: Xochilt Patel MD [facility]  Sports Medicine Physician  [facility]    -----  Chief Complaint   Patient presents with    Left Knee - Pain       SUBJECTIVE  Flor Seth is a/an 63 year old female who is seen in consultation at the request of  Xochilt Ro C.N.P. for evaluation of left knee.    The patient is seen by themselves.    Onset:  "2 month(s) ago. Reports insidious onset without acute precipitating event. Noticed posterior knee swelling.  Location of Pain: left knee; no tenderness, pain, it is just \"there\"  Worsened by: not sure  Better with: nothing   Treatments tried: no treatment tried to date and previous imaging (xray)  Associated symptoms: swelling    Orthopedic/Surgical history: NO  Social History/Occupation: working at [employer], active for her job      REVIEW OF SYSTEMS:  Review of Systems    OBJECTIVE:  /81   Pulse 71   Wt 61.7 kg (136 lb)   LMP  (LMP Unknown)   BMI 27.94 kg/m     General: healthy, alert and in no distress  Skin: no suspicious lesions or rash.  CV: distal perfusion intact  Resp: normal respiratory effort without conversational dyspnea   Psych: normal mood and affect  Gait: NORMAL  Neuro: Normal light sensory exam of lower extremity    Bilateral Knee exam    Inspection:      posterior knee swelling left    Patella:      Crepitus noted in the patellofemoral joint bilateral    Tender:      none currently    Non Tender:      remainder of knee area bilateral    Knee ROM:      Full active and passive ROM with flexion and extension bilateral    Hip ROM:     Full active and passive ROM bilateral    Strength:      5/5 with knee extension bilateral    Special Tests:     neg (-) Chelle left       neg (-) anterior drawer left       neg (-) posterior drawer left       neg (-) varus at 0 deg and 30 deg left       neg (-) valgus at 0 deg and 30 deg left    Gait:      normal    Neurovascular:      2+ peripheral pulses bilaterally and brisk capillary refill       sensation grossly intact    RADIOLOGY:  Final results and radiologist's interpretation, available in the [facility] health record.  Images were reviewed with the patient in the office today.  My personal interpretation of the performed imaging:    Reviewed Knee XR 2 view of bilateral knees -moderate to severe degenerative changes on the left, worse in the patellofemoral " compartment    Review of the result(s) of each unique test - XR

## 2023-08-09 ENCOUNTER — OFFICE VISIT (OUTPATIENT)
Dept: ORTHOPEDICS | Facility: CLINIC | Age: 63
End: 2023-08-09
Payer: COMMERCIAL

## 2023-08-09 VITALS
WEIGHT: 136 LBS | DIASTOLIC BLOOD PRESSURE: 74 MMHG | HEART RATE: 66 BPM | SYSTOLIC BLOOD PRESSURE: 116 MMHG | BODY MASS INDEX: 27.94 KG/M2

## 2023-08-09 DIAGNOSIS — M75.101 ROTATOR CUFF SYNDROME OF RIGHT SHOULDER: ICD-10-CM

## 2023-08-09 DIAGNOSIS — M19.011 ARTHRITIS OF RIGHT ACROMIOCLAVICULAR JOINT: Primary | ICD-10-CM

## 2023-08-09 DIAGNOSIS — M25.811 IMPINGEMENT OF RIGHT SHOULDER: ICD-10-CM

## 2023-08-09 PROBLEM — M17.12 ARTHRITIS OF LEFT KNEE: Status: ACTIVE | Noted: 2023-08-09

## 2023-08-09 PROBLEM — M71.22 BAKER'S CYST OF KNEE, LEFT: Status: ACTIVE | Noted: 2023-08-09

## 2023-08-09 PROCEDURE — 99214 OFFICE O/P EST MOD 30 MIN: CPT | Performed by: PEDIATRICS

## 2023-08-09 NOTE — PATIENT INSTRUCTIONS
Low suspicion for rotator cuff tear given current history and exam.  Recommended rest from irritating activities coupled with physical therapy.  Would consider further imaging or treatment pending clinical course.    Plan:  - Today's Plan of Care:  Home Exercise Program  Discussed activity considerations and other supportive care including Ice/Heat, OTC and other topical medications as needed.    -We also discussed other future treatment options:  Referral to Physical Therapy  Consideration of corticosteroid injection  MRI if more severe or doesn't improve    Follow Up: 6 - 8 weeks and as needed    If you have any further questions for your physician or physician s care team you can call [phone] and use option 3 to leave a voice message.

## 2023-08-09 NOTE — LETTER
8/9/2023         RE: Flor Seth  [address] [address]        Dear Colleague,    Thank you for referring your patient, Flor Seth, to the [facility] [place]. Please see a copy of my visit note below.    ASSESSMENT & PLAN    Flor was seen today for pain.    Diagnoses and all orders for this visit:    Arthritis of right acromioclavicular joint    Rotator cuff syndrome of right shoulder    Impingement of right shoulder        ICD-10-CM    1. Arthritis of right acromioclavicular joint  M19.011       2. Rotator cuff syndrome of right shoulder  M75.101       3. Impingement of right shoulder  M25.811         Patient Instructions   Low suspicion for rotator cuff tear given current history and exam.  Recommended rest from irritating activities coupled with physical therapy.  Would consider further imaging or treatment pending clinical course.    Plan:  - Today's Plan of Care:  Home Exercise Program  Discussed activity considerations and other supportive care including Ice/Heat, OTC and other topical medications as needed.    -We also discussed other future treatment options:  Referral to Physical Therapy  Consideration of corticosteroid injection  MRI if more severe or doesn't improve    Follow Up: 6 - 8 weeks and as needed    If you have any further questions for your physician or physician s care team you can call [phone] and use option 3 to leave a voice message.     Concerning signs and symptoms were reviewed and all questions were answered at this time.    Thanks for the opportunity to participate in the care of this patient, I will keep you updated on their progress.    CC: Xochilt Patel MD [facility]  Sports Medicine Physician  [facility]    -----  Chief Complaint   Patient presents with     Right Shoulder - Pain       SUBJECTIVE  Flor Seth is a/an 63 year old female who is seen in consultation at  the request of  Xochilt Ro C.N.P. for evaluation of right shoulder.     The patient is seen by themselves.  The patient is Right handed    Onset: 2 years(s) ago. Reports insidious onset without acute precipitating event.  Location of Pain: right shoulder; superior, posterior   Worsened by: reaching overhead, abduction, flexion   Better with: tylenol  Treatments tried: ice, heat, Tylenol, and previous imaging (xray 23)  Associated symptoms: weakness of right shoulder and pain with movement     Orthopedic/Surgical history: NO  Social History/Occupation: works at [employer]      REVIEW OF SYSTEMS:  Review of Systems    OBJECTIVE:  /74   Pulse 66   Wt 61.7 kg (136 lb)   LMP  (LMP Unknown)   BMI 27.94 kg/m     General: healthy, alert and in no distress  Skin: no suspicious lesions or rash.  CV: distal perfusion intact   Resp: normal respiratory effort without conversational dyspnea   Psych: normal mood and affect  Gait: NORMAL  Neuro: Normal light sensory exam of upper extremity    Bilateral Shoulder exam    Inspection and Posture:       normal    Skin:        no visible deformities    Tender:        upper trapezius right    Non Tender:       remainder of shoulder bilateral    ROM:        Full active and passive ROM with flexion, extension, abduction, internal and external rotation bilateral       asymmetric scapular motion    Painful motions:       end range flexion and elevation right    Strength:        abduction 5/5 bilateral       flexion 5/5 bilateral       internal rotation 5/5 bilateral       external rotation 5/5 bilateral    Impingement testing:       neg (-) Neer right       positive (+) Stout right    Sensation:        normal sensation over shoulder and upper extremity       RADIOLOGY:  Final results and radiologist's interpretation, available in the [facility] health record.  Images were reviewed with the patient in the office today.  My personal interpretation of the performed imagin  XR views of right shoulder reviewed 7/12/2023: no acute bony abnormality, AC joint degenerative change  - will follow official read    Review of prior external note(s) from - PCP note  Review of the result(s) of each unique test - XR             Again, thank you for allowing me to participate in the care of your patient.        Sincerely,        Suri Patel MD

## 2023-08-09 NOTE — PROGRESS NOTES
ASSESSMENT & PLAN    Flor was seen today for pain.    Diagnoses and all orders for this visit:    Arthritis of right acromioclavicular joint    Rotator cuff syndrome of right shoulder    Impingement of right shoulder        ICD-10-CM    1. Arthritis of right acromioclavicular joint  M19.011       2. Rotator cuff syndrome of right shoulder  M75.101       3. Impingement of right shoulder  M25.811         Patient Instructions   Low suspicion for rotator cuff tear given current history and exam.  Recommended rest from irritating activities coupled with physical therapy.  Would consider further imaging or treatment pending clinical course.    Plan:  - Today's Plan of Care:  Home Exercise Program  Discussed activity considerations and other supportive care including Ice/Heat, OTC and other topical medications as needed.    -We also discussed other future treatment options:  Referral to Physical Therapy  Consideration of corticosteroid injection  MRI if more severe or doesn't improve    Follow Up: 6 - 8 weeks and as needed    If you have any further questions for your physician or physician s care team you can call [phone] and use option 3 to leave a voice message.     Concerning signs and symptoms were reviewed and all questions were answered at this time.    Thanks for the opportunity to participate in the care of this patient, I will keep you updated on their progress.    CC: Xochilt Patel MD [facility]  Sports Medicine Physician  [facility]    -----  Chief Complaint   Patient presents with    Right Shoulder - Pain       SUBJECTIVE  Flor Seth is a/an 63 year old female who is seen in consultation at the request of  Xochilt Ro C.N.P. for evaluation of right shoulder.     The patient is seen by themselves.  The patient is Right handed    Onset: 2 years(s) ago. Reports insidious onset without acute precipitating event.  Location of Pain: right shoulder;  superior, posterior   Worsened by: reaching overhead, abduction, flexion   Better with: tylenol  Treatments tried: ice, heat, Tylenol, and previous imaging (xray 23)  Associated symptoms: weakness of right shoulder and pain with movement     Orthopedic/Surgical history: NO  Social History/Occupation: works at [employer]      REVIEW OF SYSTEMS:  Review of Systems    OBJECTIVE:  /74   Pulse 66   Wt 61.7 kg (136 lb)   LMP  (LMP Unknown)   BMI 27.94 kg/m     General: healthy, alert and in no distress  Skin: no suspicious lesions or rash.  CV: distal perfusion intact   Resp: normal respiratory effort without conversational dyspnea   Psych: normal mood and affect  Gait: NORMAL  Neuro: Normal light sensory exam of upper extremity    Bilateral Shoulder exam    Inspection and Posture:       normal    Skin:        no visible deformities    Tender:        upper trapezius right    Non Tender:       remainder of shoulder bilateral    ROM:        Full active and passive ROM with flexion, extension, abduction, internal and external rotation bilateral       asymmetric scapular motion    Painful motions:       end range flexion and elevation right    Strength:        abduction 5/5 bilateral       flexion 5/5 bilateral       internal rotation 5/5 bilateral       external rotation 5/5 bilateral    Impingement testing:       neg (-) Neer right       positive (+) Stout right    Sensation:        normal sensation over shoulder and upper extremity       RADIOLOGY:  Final results and radiologist's interpretation, available in the [facility] health record.  Images were reviewed with the patient in the office today.  My personal interpretation of the performed imagin XR views of right shoulder reviewed 2023: no acute bony abnormality, AC joint degenerative change  - will follow official read    Review of prior external note(s) from - PCP note  Review of the result(s) of each unique test - XR

## 2023-08-31 ENCOUNTER — OFFICE VISIT (OUTPATIENT)
Dept: ORTHOPEDICS | Facility: CLINIC | Age: 63
End: 2023-08-31
Payer: COMMERCIAL

## 2023-08-31 VITALS
DIASTOLIC BLOOD PRESSURE: 84 MMHG | HEIGHT: 59 IN | WEIGHT: 135 LBS | SYSTOLIC BLOOD PRESSURE: 166 MMHG | BODY MASS INDEX: 27.21 KG/M2

## 2023-08-31 DIAGNOSIS — M17.12 ARTHRITIS OF LEFT KNEE: Primary | ICD-10-CM

## 2023-08-31 PROCEDURE — 20611 DRAIN/INJ JOINT/BURSA W/US: CPT | Mod: LT | Performed by: FAMILY MEDICINE

## 2023-08-31 RX ADMIN — TRIAMCINOLONE ACETONIDE 40 MG: 40 INJECTION, SUSPENSION INTRA-ARTICULAR; INTRAMUSCULAR at 15:30

## 2023-08-31 RX ADMIN — ROPIVACAINE HYDROCHLORIDE 3 ML: 5 INJECTION, SOLUTION EPIDURAL; INFILTRATION; PERINEURAL at 15:30

## 2023-08-31 NOTE — PROGRESS NOTES
Flor Seth  :  1960  DOS: 2023  MRN: [id number]    Sports Medicine Clinic Procedure    Ultrasound Guided Left Intra-Articular Knee Injection, +/- Aspiration    Clinical History: Interim History - 2023  Since last visit on 2023 with Dr Patel patient has moderate-severe radiating left medial and posterior knee pain.  Patient would like to discuss possible ultrasound guided injection today and further review XR results.  No interim injury.       Diagnosis:   1. Arthritis of left knee      Referring Physician: Suri Patel MD  Large Joint Injection/Arthocentesis: L knee joint    Date/Time: 2023 3:30 PM    Performed by: Salvador Jaimes DO  Authorized by: Salvador Jaimes DO    Indications:  Pain  Needle Size:  21 G  Guidance: ultrasound    Approach:  Superolateral  Location:  Knee      Medications:  40 mg triamcinolone 40 MG/ML; 3 mL ROPivacaine 5 MG/ML  Aspirate amount (mL):  5  Aspirate:  Serous and yellow  Outcome:  Tolerated well, no immediate complications  Procedure discussed: discussed risks, benefits, and alternatives    Consent Given by:  Patient  Timeout: timeout called immediately prior to procedure    Prep: patient was prepped and draped in usual sterile fashion     Ultrasound images of procedure were permanently stored.       Impression:  Successful Left intra-articular knee injection and aspiration.    Plan:  Follow up as directed by Dr Patel  Expectations and goals of CSI reviewed  Often 2-3 days for steroid effect, and can take up to two weeks for maximum effect  We discussed modified progressive pain-free activity as tolerated  Do not overuse in first two weeks if feeling better due to concern for vulnerability while steroid is working  Supportive care reviewed  All questions were answered today  Contact us with additional questions or concerns  Signs and sx of concern reviewed      Salvador Jaimes DO, CAVIANEY  Primary Care Sports Medicine  [facility]

## 2023-08-31 NOTE — LETTER
2023         RE: Flor Seth  [address] [address]        Dear Colleague,    Thank you for referring your patient, Flor Seth, to the [facility] [place]. Please see a copy of my visit note below.    Flor Seth  :  1960  DOS: 2023  MRN: [id number]    Sports Medicine Clinic Procedure    Ultrasound Guided Left Intra-Articular Knee Injection, +/- Aspiration    Clinical History: Interim History - 2023  Since last visit on 2023 with Dr Patel patient has moderate-severe radiating left medial and posterior knee pain.  Patient would like to discuss possible ultrasound guided injection today and further review XR results.  No interim injury.       Diagnosis:   1. Arthritis of left knee      Referring Physician: Suri Patel MD  Large Joint Injection/Arthocentesis: L knee joint    Date/Time: 2023 3:30 PM    Performed by: Salvador Jaimes DO  Authorized by: Salvador Jaimes DO    Indications:  Pain  Needle Size:  21 G  Guidance: ultrasound    Approach:  Superolateral  Location:  Knee      Medications:  40 mg triamcinolone 40 MG/ML; 3 mL ROPivacaine 5 MG/ML  Aspirate amount (mL):  5  Aspirate:  Serous and yellow  Outcome:  Tolerated well, no immediate complications  Procedure discussed: discussed risks, benefits, and alternatives    Consent Given by:  Patient  Timeout: timeout called immediately prior to procedure    Prep: patient was prepped and draped in usual sterile fashion     Ultrasound images of procedure were permanently stored.       Impression:  Successful Left intra-articular knee injection and aspiration.    Plan:  Follow up as directed by Dr Patel  Expectations and goals of CSI reviewed  Often 2-3 days for steroid effect, and can take up to two weeks for maximum effect  We discussed modified progressive pain-free activity as tolerated  Do not overuse in first two weeks if feeling  better due to concern for vulnerability while steroid is working  Supportive care reviewed  All questions were answered today  Contact us with additional questions or concerns  Signs and sx of concern reviewed      Salvador Jaimes DO, CAQ  Primary Care Sports Medicine  [facility]       Again, thank you for allowing me to participate in the care of your patient.        Sincerely,        Salvador Jaimes DO

## 2023-08-31 NOTE — PROGRESS NOTES
"Flor Seth  :  1960  DOS: 2023  MRN: [id number]    Sports Medicine Clinic Visit    PCP: Xochilt Ro    Flor Seth is a 63 year old female who is seen in follow-up presenting with left knee pain.    Interim History - 2023  Since last visit on 2023 with Dr Patel patient has moderate-severe radiating left medial and posterior knee pain.  Patient would like to discuss possible ultrasound guided injection today and further review XR results.  No interim injury.       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    Past Medical History:   Diagnosis Date    Anemia     Hypertension     Iron deficiency anemia 2005    menorrhagia     Menorrhagia 2011    S/p ablation     Type 2 diabetes mellitus without complication, without long-term current use of insulin (H) 2023     Past Surgical History:   Procedure Laterality Date     SECTION      twins    DILATION AND CURETTAGE, HYSTEROSCOPY, ABLATE ENDOMETRIUM NOVASURE, COMBINED  2011    D&C HSC ablation    PHACOEMULSIFICATION CLEAR CORNEA WITH TORIC INTRAOCULAR LENS IMPLANT Right 2019    Procedure: Cataract removal with implant. (Symfony lens);  Surgeon: Caremlo Lock MD;  Location: [place] OR    PHACOEMULSIFICATION CLEAR CORNEA WITH TORIC INTRAOCULAR LENS IMPLANT Left 2019    Procedure: Cataract removal with implant. (Symfony lens);  Surgeon: Carmelo Lock MD;  Location: [place] OR    SURGICAL HISTORY OF -   , ,     Vaginal delivery     Family History   Problem Relation Age of Onset    C.A.D. Mother     Hypertension Other     Cancer No family hx of        Objective  BP (!) 166/84   Ht 1.486 m (4' 10.5\")   Wt 61.2 kg (135 lb)   LMP  (LMP Unknown)   BMI " "27.73 kg/m      General: healthy, alert and in no distress    HEENT: no scleral icterus or conjunctival erythema   Skin: no suspicious lesions or rash. No jaundice.   CV: regular rhythm by palpation, 2+ distal pulses, no pedal edema    Resp: normal respiratory effort without conversational dyspnea   Psych: normal mood and affect    Gait: ***antalgic, appropriate coordination and balance   Neuro: normal light touch sensory exam of the extremities. Motor strength as noted below     {RIGHT/LEFT:702689::\"Bilateral\"} Knee exam    ROM:   {FSOC ROM:298490::\"     Full active and passive ROM with flexion and extension\"}    Inspection:  {knee:657494::\"     no visible ecchymosis\",\"     no visible edema or effusion\"}    Skin:  {skin:420550}    Patellar Motion:   {Patella motion:000758::\"     Normal patellar tracking noted through range of motion\"}    Tender:   {Tenderness:181500}    Non Tender:   {non tender:921968::\"      remainder of knee area\"}    Special Tests:   {Special tests:362920}    Evaluation of ipsilateral kinetic chain  {ipsilateral kinetic:760948::\"     normal strength with hip extension and abduction\",\"     normal strength with single leg squat\",\"     normal medial longitudinal arch in both feet\"}      Radiology  ***    Assessment:  No diagnosis found.    Plan:  Discussed the assessment with the patient.  {[facility] Plan:822476::\"Follow up: ***\"}          Disclaimer: This note consists of symbols derived from keyboarding, dictation and/or voice recognition software. As a result, there may be errors in the script that have gone undetected. Please consider this when interpreting information found in this chart.  "

## 2023-09-11 RX ORDER — ROPIVACAINE HYDROCHLORIDE 5 MG/ML
3 INJECTION, SOLUTION EPIDURAL; INFILTRATION; PERINEURAL
Status: DISCONTINUED | OUTPATIENT
Start: 2023-08-31 | End: 2024-02-22

## 2023-09-11 RX ORDER — TRIAMCINOLONE ACETONIDE 40 MG/ML
40 INJECTION, SUSPENSION INTRA-ARTICULAR; INTRAMUSCULAR
Status: DISCONTINUED | OUTPATIENT
Start: 2023-08-31 | End: 2024-02-22

## 2023-09-18 ENCOUNTER — TELEPHONE (OUTPATIENT)
Dept: FAMILY MEDICINE | Facility: CLINIC | Age: 63
End: 2023-09-18
Payer: COMMERCIAL

## 2023-09-18 DIAGNOSIS — E11.9 TYPE 2 DIABETES MELLITUS WITHOUT COMPLICATION, WITHOUT LONG-TERM CURRENT USE OF INSULIN (H): Primary | ICD-10-CM

## 2023-09-18 NOTE — LETTER
September 26, 2023      Flor Seth  [address] [address]        Dear Flor,     Your team at [facility] cares about your health. We have reviewed your chart and based on our findings; we are making the following recommendations to better manage your health.     You are in particular need of attention regarding the following:     Schedule a DIABETIC FOLLOW UP appointment for Office Visit. Patients with diabetes should see their provider regularly.  HYPERTENSION FOLLOW UP: Office Visit  Blood pressure was elevated at last clinic visit (ortho). It is important to maintain a blood pressure <140/90.  Please call to schedule for :      Come in for a free RN appointment to recheck blood pressure      Patient is due for a diabetes follow up appointment with me in OCTOBER.       Non-fasting labs due:  A1C     Orders were placed, please have lab work done before visit.       Please ask patient to bring in their glucometer so we can download the data.     Please call patient to schedule.  Xochilt Ro CNP     If you have already completed these items, please contact the clinic via phone or   MyChart so your care team can review and update your records. Thank you for   choosing [facility] for your healthcare needs. For any questions,   concerns, or to schedule an appointment please contact our clinic.    Sincerely,  JUN Arreguin CNP

## 2023-09-18 NOTE — TELEPHONE ENCOUNTER
Blood pressure was elevated at last clinic visit (ortho). It is important to maintain a blood pressure <140/90.  Please call patient and ask them to:      Come in for a free RN appointment to recheck blood pressure          Patient is due for a diabetes follow up appointment with me in OCTOBER.      Non-fasting labs due:  A1C    Orders were placed, please have lab work done before visit.      Please ask patient to bring in their glucometer so we can download the data.    Please call patient to schedule.  Xochilt Ro, CNP

## 2023-09-18 NOTE — TELEPHONE ENCOUNTER
Patient Quality Outreach    Patient is due for the following:   Hypertension -  BP check  Blood pressure was elevated at last clinic visit (ortho). It is important to maintain a blood pressure <140/90.  Please call patient and ask them to:        Come in for a free RN appointment to recheck blood pressure              Patient is due for a diabetes follow up appointment with me in OCTOBER.       Non-fasting labs due:  A1C     Orders were placed, please have lab work done before visit.       Please ask patient to bring in their glucometer so we can download the data.     Please call patient to schedule.  Xochilt Ro CNP  Next Steps:   Schedule a nurse only visit for Bp office visit for Diabetes in October     Type of outreach:    Phone, left message for patient/parent to call back.      Questions for provider review:    None           Augustine Alejandro, CMA

## 2023-09-26 NOTE — TELEPHONE ENCOUNTER
Patient Quality Outreach    Patient is due for the following:   Hypertension -  BP check  Blood pressure was elevated at last clinic visit (ortho). It is important to maintain a blood pressure <140/90.  Please call patient and ask them to:        Come in for a free RN appointment to recheck blood pressure              Patient is due for a diabetes follow up appointment with me in OCTOBER.       Non-fasting labs due:  A1C     Orders were placed, please have lab work done before visit.       Please ask patient to bring in their glucometer so we can download the data.     Please call patient to schedule.  Xochilt Ro CNP     Next Steps:   Schedule a office visit for diabetes, htn , labs      Type of outreach:    Sent letter.      Questions for provider review:    None           Augustine Alejnadro, CMA

## 2023-10-20 ENCOUNTER — TELEPHONE (OUTPATIENT)
Dept: FAMILY MEDICINE | Facility: CLINIC | Age: 63
End: 2023-10-20

## 2023-10-20 ENCOUNTER — LAB (OUTPATIENT)
Dept: LAB | Facility: CLINIC | Age: 63
End: 2023-10-20
Payer: COMMERCIAL

## 2023-10-20 ENCOUNTER — ALLIED HEALTH/NURSE VISIT (OUTPATIENT)
Dept: FAMILY MEDICINE | Facility: CLINIC | Age: 63
End: 2023-10-20
Payer: COMMERCIAL

## 2023-10-20 VITALS
RESPIRATION RATE: 16 BRPM | HEART RATE: 86 BPM | OXYGEN SATURATION: 98 % | SYSTOLIC BLOOD PRESSURE: 120 MMHG | DIASTOLIC BLOOD PRESSURE: 64 MMHG

## 2023-10-20 DIAGNOSIS — E11.9 TYPE 2 DIABETES MELLITUS WITHOUT COMPLICATION, WITHOUT LONG-TERM CURRENT USE OF INSULIN (H): ICD-10-CM

## 2023-10-20 DIAGNOSIS — I10 BENIGN ESSENTIAL HYPERTENSION: Primary | ICD-10-CM

## 2023-10-20 LAB — HBA1C MFR BLD: 6.1 % (ref 0–5.6)

## 2023-10-20 PROCEDURE — 83036 HEMOGLOBIN GLYCOSYLATED A1C: CPT

## 2023-10-20 PROCEDURE — 99207 PR NO CHARGE NURSE ONLY: CPT

## 2023-10-20 PROCEDURE — 36415 COLL VENOUS BLD VENIPUNCTURE: CPT

## 2023-10-20 NOTE — PROGRESS NOTES
Flor Seth is a 63 year old year old patient who comes in today for a Blood Pressure check because of ongoing blood pressure monitoring.  Panel management-needed BP check  Last OV with sports med, /84 p66  Pt had labs done today.    Vital Signs as repeated by RN   126/70 p86  120/64 p86    Patient is taking medication as prescribed  Patient is tolerating medications well.  Patient is monitoring Blood Pressure at home.    120's/70's  Current complaints: none  Disposition:  routed to provider for review.    Eloise Somers RN

## 2023-10-20 NOTE — TELEPHONE ENCOUNTER
XochiltAlpalma NIMISHA Seth is a 63 year old year old patient who comes in today for a Blood Pressure check because of ongoing blood pressure monitoring.  Panel management-needed BP check  Last OV with sports med, /84 p66  Pt had labs done today.     Vital Signs as repeated by RN   126/70 p86  120/64 p86     Patient is taking medication as prescribed  Patient is tolerating medications well.  Patient is monitoring Blood Pressure at home.    120's/70's  Current complaints: none  Disposition:  routed to provider for review.     Eloise Somers RN

## 2023-12-08 ENCOUNTER — IMMUNIZATION (OUTPATIENT)
Dept: FAMILY MEDICINE | Facility: CLINIC | Age: 63
End: 2023-12-08
Payer: COMMERCIAL

## 2023-12-08 PROCEDURE — 90471 IMMUNIZATION ADMIN: CPT

## 2023-12-08 PROCEDURE — 90682 RIV4 VACC RECOMBINANT DNA IM: CPT

## 2024-02-07 ENCOUNTER — TELEPHONE (OUTPATIENT)
Dept: ORTHOPEDICS | Facility: CLINIC | Age: 64
End: 2024-02-07
Payer: COMMERCIAL

## 2024-02-07 DIAGNOSIS — M17.12 ARTHRITIS OF LEFT KNEE: Primary | ICD-10-CM

## 2024-02-07 NOTE — TELEPHONE ENCOUNTER
Patient scheduled for appointment on 2/22/24 @ [facility] for discussion of viscosupplementation injection vs steroid injection of left knee.        Steroid  injection last completed 8/31/23.  Patient reports relief for 4 months       Patient has failed trial of OTC NSAIDs/Pain Medication (ibuprofen, tylenol, naproxen,...):  Yes       Patient has completed trial of physical therapy: No    Prior authorization referral for SynviscOne injection pended.    Please advise    Gene Mccall ATC

## 2024-02-21 NOTE — TELEPHONE ENCOUNTER
----- Message from Leighann Levy sent at 2/21/2024  9:36 AM CST -----  Regarding: RE: SynviscOne PA  Per the pharmacy- MUST USE DUROLANE, EUFLEXXA, GELSYN-3, OR SUPARTZ FX    If you want to try one of those products we can see if we can get an approval. Just let me know.    Leighann  ----- Message -----  From: Gene Mccall ATC  Sent: 2/21/2024   9:00 AM CST  To: Cam   Subject: SynviscOne PA                                    Can we get an update on this patient's SynviscOne PA?  They are scheduled for an injection on 2/22/24.    Thanks,  Gene Mccall, ATC

## 2024-02-21 NOTE — TELEPHONE ENCOUNTER
Reviewed preferred medications - no contraindication to Durolane.  Updated PA order placed per standard provider protocol.    Gene Mccall, ATC

## 2024-02-21 NOTE — TELEPHONE ENCOUNTER
Received message from CAM team - PA for Durolane was denied, patient needs to complete trial of physical therapy.  Will discuss further with patient at follow up visit on 2/22/24.    Gene Mccall ATC

## 2024-02-22 ENCOUNTER — OFFICE VISIT (OUTPATIENT)
Dept: ORTHOPEDICS | Facility: CLINIC | Age: 64
End: 2024-02-22
Payer: COMMERCIAL

## 2024-02-22 VITALS
BODY MASS INDEX: 25 KG/M2 | SYSTOLIC BLOOD PRESSURE: 158 MMHG | HEIGHT: 59 IN | WEIGHT: 124 LBS | DIASTOLIC BLOOD PRESSURE: 95 MMHG

## 2024-02-22 DIAGNOSIS — M17.12 ARTHRITIS OF LEFT KNEE: Primary | ICD-10-CM

## 2024-02-22 PROCEDURE — 20611 DRAIN/INJ JOINT/BURSA W/US: CPT | Mod: LT | Performed by: FAMILY MEDICINE

## 2024-02-22 RX ADMIN — TRIAMCINOLONE ACETONIDE 40 MG: 40 INJECTION, SUSPENSION INTRA-ARTICULAR; INTRAMUSCULAR at 15:28

## 2024-02-22 RX ADMIN — ROPIVACAINE HYDROCHLORIDE 3 ML: 5 INJECTION, SOLUTION EPIDURAL; INFILTRATION; PERINEURAL at 15:28

## 2024-02-22 SDOH — HEALTH STABILITY: PHYSICAL HEALTH: ON AVERAGE, HOW MANY DAYS PER WEEK DO YOU ENGAGE IN MODERATE TO STRENUOUS EXERCISE (LIKE A BRISK WALK)?: 4 DAYS

## 2024-02-22 SDOH — HEALTH STABILITY: PHYSICAL HEALTH: ON AVERAGE, HOW MANY MINUTES DO YOU ENGAGE IN EXERCISE AT THIS LEVEL?: 120 MIN

## 2024-02-22 NOTE — PROGRESS NOTES
"Flor Seth  :  1960  DOS: 2024  MRN: [id number]    Chief Complaint   Patient presents with    Left Knee - Follow Up, Pain       Interim History - 2024  Since last visit on 23 patient has moderate-severe left knee pain & swelling over the past ~ 2+ months.  Left knee steroid injection completed on 23 provided relief for ~ 3+ months.  Patient is interested in repeat injection.  No new injury in the interim.      REVIEW OF SYSTEMS:  Review of Systems   All other systems reviewed and are negative.      OBJECTIVE:  BP (!) 158/95   Ht 1.486 m (4' 10.5\")   Wt 56.2 kg (124 lb)   BMI 25.47 kg/m     General: healthy, alert and in no distress  Skin: no suspicious lesions or rash.  CV: distal perfusion intact   Resp: normal respiratory effort without conversational dyspnea   Psych: normal mood and affect  Gait: mildly antalgic  Neuro: Normal light sensory exam of upper extremity    Left Knee exam    ROM:        Flexion 130 degrees       Extension -2 degrees       Range of motion limited by pain, mechanical    Inspection:       no visible ecchymosis        effusion noted trace    Skin:       no visible deformities       well perfused       capillary refill brisk    Patellar Motion:        Crepitus noted in the patellofemoral joint    Tender:        lateral patellar border       medial joint line       lateral joint line    Non Tender:         remainder of knee area        along MCL        distal IT Band        infrapatellar tendon        tibial tubercle        pes anserine bursa    Special Tests:        neg (-) varus at 0 deg and 30 deg       neg (-) valgus at 0 deg and 30 deg    Evaluation of ipsilateral kinetic chain       B/l decreased quad tone and core deconditioning      RADIOLOGY:  Final results and radiologist's interpretation, available in the [facility] health record.  Images were reviewed with the patient in the office today.  My personal interpretation of the performed " imagin XR views of right shoulder reviewed 2023: no acute bony abnormality, AC joint degenerative change  - will follow official read      Large Joint Injection/Arthocentesis: L knee joint    Date/Time: 2024 3:28 PM    Performed by: Salvador Jaimes DO  Authorized by: Salvador Jaimes DO    Indications:  Pain and osteoarthritis  Needle Size:  22 G  Guidance: ultrasound    Approach:  Superolateral  Location:  Knee      Medications:  40 mg triamcinolone 40 MG/ML; 3 mL ROPivacaine 5 MG/ML  Aspirate amount (mL):  2  Aspirate:  Serous and yellow  Outcome:  Tolerated well, no immediate complications  Procedure discussed: discussed risks, benefits, and alternatives    Consent Given by:  Patient  Timeout: timeout called immediately prior to procedure    Prep: patient was prepped and draped in usual sterile fashion     Ultrasound images of procedure were permanently stored.       ASSESSMENT & PLAN  (M17.12) Arthritis of left knee  (primary encounter diagnosis)    Plan:  Follow up prn based on short term clinical progress  Good relief from prior CSI, pain has now acutely flared and not improving with supportive measures  XR images independently visualized and reviewed with patient today in clinic  Reviewed wt loss, activity modification and progressive increase in activity as tolerated and guided by pain  Reviewed options for potential steroid vs viscosupplementation injections and the possibility for future orthopedic referral prn  Reviewed safe and appropriate OTC medication choices, try tylenol first  Up to 3000mg daily of tylenol is generally safe, NSAID dosing and duration limitations reviewed, consider topical Voltaren gel  Discussed nature of degenerative arthrosis of the knee.   Discussed symptom treatment with ice or heat, topical treatments, and rest if needed.   Repeat US guided CSI performed today, with aspiration  Reviewed  Pre-authorization for viscosupplementation was denied  due to not having tried physical therapy, reviewed in detail and offered today, patient declined for now  Low impact activity strategies reviewed and basic HEP reviewed  Expectations and goals of CSI reviewed  Often 2-3 days for steroid effect, and can take up to two weeks for maximum effect  We discussed modified progressive pain-free activity as tolerated  Do not overuse in first two weeks if feeling better due to concern for vulnerability while steroid is working  Supportive care reviewed  All questions were answered today  Contact us with additional questions or concerns  Signs and sx of concern reviewed      Salvador Jaimes DO, CAQ  Sports Medicine Physician  [facility]

## 2024-02-22 NOTE — LETTER
"    2024         RE: Flor Seth  [address] [address]        Dear Colleague,    Thank you for referring your patient, Flor Seth, to the [facility] [place]. Please see a copy of my visit note below.    Flor Seth  :  1960  DOS: 2024  MRN: [id number]    Chief Complaint   Patient presents with     Left Knee - Follow Up, Pain       Interim History - 2024  Since last visit on 23 patient has moderate-severe left knee pain & swelling over the past ~ 2+ months.  Left knee steroid injection completed on 23 provided relief for ~ 3+ months.  Patient is interested in repeat injection.  No new injury in the interim.      REVIEW OF SYSTEMS:  Review of Systems   All other systems reviewed and are negative.      OBJECTIVE:  BP (!) 158/95   Ht 1.486 m (4' 10.5\")   Wt 56.2 kg (124 lb)   BMI 25.47 kg/m     General: healthy, alert and in no distress  Skin: no suspicious lesions or rash.  CV: distal perfusion intact   Resp: normal respiratory effort without conversational dyspnea   Psych: normal mood and affect  Gait: mildly antalgic  Neuro: Normal light sensory exam of upper extremity    Left Knee exam    ROM:        Flexion 130 degrees       Extension -2 degrees       Range of motion limited by pain, mechanical    Inspection:       no visible ecchymosis        effusion noted trace    Skin:       no visible deformities       well perfused       capillary refill brisk    Patellar Motion:        Crepitus noted in the patellofemoral joint    Tender:        lateral patellar border       medial joint line       lateral joint line    Non Tender:         remainder of knee area        along MCL        distal IT Band        infrapatellar tendon        tibial tubercle        pes anserine bursa    Special Tests:        neg (-) varus at 0 deg and 30 deg       neg (-) valgus at 0 deg and 30 deg    Evaluation of ipsilateral " kinetic chain       B/l decreased quad tone and core deconditioning      RADIOLOGY:  Final results and radiologist's interpretation, available in the [facility] health record.  Images were reviewed with the patient in the office today.  My personal interpretation of the performed imagin XR views of right shoulder reviewed 2023: no acute bony abnormality, AC joint degenerative change  - will follow official read      Large Joint Injection/Arthocentesis: L knee joint    Date/Time: 2024 3:28 PM    Performed by: Salvador Jaimes DO  Authorized by: Salvador Jaimes DO    Indications:  Pain and osteoarthritis  Needle Size:  22 G  Guidance: ultrasound    Approach:  Superolateral  Location:  Knee      Medications:  40 mg triamcinolone 40 MG/ML; 3 mL ROPivacaine 5 MG/ML  Aspirate amount (mL):  2  Aspirate:  Serous and yellow  Outcome:  Tolerated well, no immediate complications  Procedure discussed: discussed risks, benefits, and alternatives    Consent Given by:  Patient  Timeout: timeout called immediately prior to procedure    Prep: patient was prepped and draped in usual sterile fashion     Ultrasound images of procedure were permanently stored.       ASSESSMENT & PLAN  (M17.12) Arthritis of left knee  (primary encounter diagnosis)    Plan:  Follow up prn based on short term clinical progress  Good relief from prior CSI, pain has now acutely flared and not improving with supportive measures  XR images independently visualized and reviewed with patient today in clinic  Reviewed wt loss, activity modification and progressive increase in activity as tolerated and guided by pain  Reviewed options for potential steroid vs viscosupplementation injections and the possibility for future orthopedic referral prn  Reviewed safe and appropriate OTC medication choices, try tylenol first  Up to 3000mg daily of tylenol is generally safe, NSAID dosing and duration limitations reviewed, consider topical  Voltaren gel  Discussed nature of degenerative arthrosis of the knee.   Discussed symptom treatment with ice or heat, topical treatments, and rest if needed.   Repeat US guided CSI performed today, with aspiration  Reviewed  Pre-authorization for viscosupplementation was denied due to not having tried physical therapy, reviewed in detail and offered today, patient declined for now  Low impact activity strategies reviewed and basic HEP reviewed  Expectations and goals of CSI reviewed  Often 2-3 days for steroid effect, and can take up to two weeks for maximum effect  We discussed modified progressive pain-free activity as tolerated  Do not overuse in first two weeks if feeling better due to concern for vulnerability while steroid is working  Supportive care reviewed  All questions were answered today  Contact us with additional questions or concerns  Signs and sx of concern reviewed      Salvador Jaimes DO, CAQ  Sports Medicine Physician  [facility]         Again, thank you for allowing me to participate in the care of your patient.        Sincerely,        Salvador Jaimes DO

## 2024-02-27 RX ORDER — TRIAMCINOLONE ACETONIDE 40 MG/ML
40 INJECTION, SUSPENSION INTRA-ARTICULAR; INTRAMUSCULAR
Status: SHIPPED | OUTPATIENT
Start: 2024-02-22

## 2024-02-27 RX ORDER — ROPIVACAINE HYDROCHLORIDE 5 MG/ML
3 INJECTION, SOLUTION EPIDURAL; INFILTRATION; PERINEURAL
Status: SHIPPED | OUTPATIENT
Start: 2024-02-22

## 2024-03-18 ENCOUNTER — TELEPHONE (OUTPATIENT)
Dept: FAMILY MEDICINE | Facility: CLINIC | Age: 64
End: 2024-03-18
Payer: COMMERCIAL

## 2024-03-18 DIAGNOSIS — E11.9 TYPE 2 DIABETES MELLITUS WITHOUT COMPLICATION, WITHOUT LONG-TERM CURRENT USE OF INSULIN (H): Primary | ICD-10-CM

## 2024-03-18 NOTE — TELEPHONE ENCOUNTER
Patient Quality Outreach    Patient is due for the following:   Diabetes -  A1C and Diabetic Follow-Up Visit    Next Steps:   Patient was scheduled for labs and office visit     Type of outreach:    Phone, spoke to patient/parent. She was scheduled       Questions for provider review:    None           Augustine Alejandro, CMA

## 2024-03-18 NOTE — TELEPHONE ENCOUNTER
Patient Quality Outreach    Patient is due for the following:   Colon Cancer Screening    Next Steps:   Due for colon cancer screen     Type of outreach:    Sent letter.      Questions for provider review:    None           Augustine Alejandro, CMA

## 2024-03-18 NOTE — LETTER
March 18, 2024      Flor Seth  [address] [address]        Dear Flor,       Your team at [facility] cares about your health. We have reviewed your chart and based on our findings; we are making the following recommendations to better manage your health.     You are in particular need of attention regarding the following:     Call or MyChart message your clinic to schedule a colonoscopy, schedule/ a FIT Test, or order a Cologuard test. If you are unsure what type of test you need, please call your clinic and speak to clinic staff.   Colon cancer is now the second leading cause of cancer-related deaths in the [facility] for both men and women and there are over 130,000 new cases and 50,000 deaths per year from colon cancer. Colonoscopies can prevent 90-95% of these deaths. Problem lesions can be removed before they ever become cancer. This test is not only looking for cancer, but also getting rid of precancerous lesions.   If you are under/uninsured, we recommend you contact the my[employer] Program.mywaves is a free colorectal cancer screening program that provides colonoscopies for eligible under/uninsured [place] men and women. If you are interested in receiving a free colonoscopy, please call my[employer] at t [phone] (mention code [employer]) to see if you're eligible. Please have them send us the results.     If you have already completed these items, please contact the clinic via phone or   [facility] so your care team can review and update your records. Thank you for   choosing [facility] for your healthcare needs. For any questions,   concerns, or to schedule an appointment please contact our clinic.      Sincerely,        JUN Arreguin CNP

## 2024-03-18 NOTE — TELEPHONE ENCOUNTER
Patient is due for a diabetes follow up appointment with me.      Non-fasting labs due:  A1C    Orders were placed, please have lab work done before visit.      Please ask patient to bring in their glucometer so we can download the data.    Please call patient to schedule.  Xochilt oR, CNP

## 2024-03-29 ENCOUNTER — OFFICE VISIT (OUTPATIENT)
Dept: FAMILY MEDICINE | Facility: CLINIC | Age: 64
End: 2024-03-29
Payer: COMMERCIAL

## 2024-03-29 ENCOUNTER — LAB (OUTPATIENT)
Dept: LAB | Facility: CLINIC | Age: 64
End: 2024-03-29
Payer: COMMERCIAL

## 2024-03-29 VITALS
SYSTOLIC BLOOD PRESSURE: 122 MMHG | HEIGHT: 58 IN | RESPIRATION RATE: 12 BRPM | TEMPERATURE: 98.6 F | WEIGHT: 118 LBS | OXYGEN SATURATION: 99 % | DIASTOLIC BLOOD PRESSURE: 62 MMHG | BODY MASS INDEX: 24.77 KG/M2 | HEART RATE: 61 BPM

## 2024-03-29 DIAGNOSIS — Z12.11 SCREEN FOR COLON CANCER: ICD-10-CM

## 2024-03-29 DIAGNOSIS — E78.5 HYPERLIPIDEMIA LDL GOAL <130: ICD-10-CM

## 2024-03-29 DIAGNOSIS — E11.9 TYPE 2 DIABETES MELLITUS WITHOUT COMPLICATION, WITHOUT LONG-TERM CURRENT USE OF INSULIN (H): ICD-10-CM

## 2024-03-29 DIAGNOSIS — E11.9 TYPE 2 DIABETES MELLITUS WITHOUT COMPLICATION, WITHOUT LONG-TERM CURRENT USE OF INSULIN (H): Primary | ICD-10-CM

## 2024-03-29 DIAGNOSIS — I10 HYPERTENSION GOAL BP (BLOOD PRESSURE) < 140/90: ICD-10-CM

## 2024-03-29 LAB — HBA1C MFR BLD: 5.8 % (ref 0–5.6)

## 2024-03-29 PROCEDURE — 99214 OFFICE O/P EST MOD 30 MIN: CPT | Performed by: NURSE PRACTITIONER

## 2024-03-29 PROCEDURE — 36415 COLL VENOUS BLD VENIPUNCTURE: CPT

## 2024-03-29 PROCEDURE — 83036 HEMOGLOBIN GLYCOSYLATED A1C: CPT

## 2024-03-29 ASSESSMENT — PAIN SCALES - GENERAL: PAINLEVEL: NO PAIN (0)

## 2024-03-29 NOTE — PROGRESS NOTES
Assessment & Plan     Type 2 diabetes mellitus without complication, without long-term current use of insulin (H)  Well-controlled.  Patient has changed her diet and has begun exercising.  She has lost weight and blood sugars are now well-controlled.  Continue healthy habits.  Continue metformin 1 tablet daily.  Follow-up in 6 months.    Hyperlipidemia LDL goal <130  Well-controlled.  Continue pravastatin.    Hypertension goal BP (blood pressure) < 140/90  Well-controlled.  Continue hydrochlorothiazide, lisinopril and metoprolol.    Screen for colon cancer  - Fecal colorectal cancer screen FIT - Future (S+30); Future      The risks, benefits and treatment options of prescribed medications or other treatments have been discussed with the patient. The patient verbalized their understanding and should call or follow up if no improvement or if they develop further problems.  Xochilt Brunfelt, CNP              Subjective   Flor is a 64 year old, presenting for the following health issues:  Diabetes and Health Maintenance (Patient declined vaccines today)        3/29/2024     7:45 AM   Additional Questions   Roomed by Britt GUZMAN   Accompanied by self     History of Present Illness       Reason for visit:  A1c pre diabetic    She eats 2-3 servings of fruits and vegetables daily.She consumes 2 sweetened beverage(s) daily.She exercises with enough effort to increase her heart rate 30 to 60 minutes per day.  She exercises with enough effort to increase her heart rate 3 or less days per week.   She is taking medications regularly.         Diabetes Follow-up    How often are you checking your blood sugar? One time daily  What time of day are you checking your blood sugars (select all that apply)?  After meals  Have you had any blood sugars above 200?  No  Have you had any blood sugars below 70?  No  What symptoms do you notice when your blood sugar is low?  None  What concerns do you have today about your diabetes? None   Do  "you have any of these symptoms? (Select all that apply)  No numbness or tingling in feet.  No redness, sores or blisters on feet.  No complaints of excessive thirst.  No reports of blurry vision.  No significant changes to weight.  Have you had a diabetic eye exam in the last 12 months? No            Hyperlipidemia Follow-Up    Are you regularly taking any medication or supplement to lower your cholesterol?   Yes- pravastatin0  Are you having muscle aches or other side effects that you think could be caused by your cholesterol lowering medication?  No    Hypertension Follow-up    Do you check your blood pressure regularly outside of the clinic? Yes  Are you following a low salt diet? No  Are your blood pressures ever more than 140 on the top number (systolic) OR more   than 90 on the bottom number (diastolic), for example 140/90? Yes-  only high at the doctor office    BP Readings from Last 2 Encounters:   03/29/24 122/62   02/22/24 (!) 158/95     Hemoglobin A1C (%)   Date Value   03/29/2024 5.8 (H)   10/20/2023 6.1 (H)   07/19/2006 5.0     LDL Cholesterol Calculated (mg/dL)   Date Value   07/12/2023 106 (H)   11/01/2021 106 (H)   11/20/2020 129 (H)   10/26/2019 128 (H)       Wt Readings from Last 4 Encounters:   03/29/24 53.5 kg (118 lb)   02/22/24 56.2 kg (124 lb)   08/31/23 61.2 kg (135 lb)   08/09/23 61.7 kg (136 lb)             Review of Systems  Constitutional, neuro, ENT, endocrine, pulmonary, cardiac, gastrointestinal, genitourinary, musculoskeletal, integument and psychiatric systems are negative, except as otherwise noted.      Objective    /62 (BP Location: Right arm, Patient Position: Sitting, Cuff Size: Adult Regular)   Pulse 61   Temp 98.6  F (37  C) (Tympanic)   Resp 12   Ht 1.48 m (4' 10.25\")   Wt 53.5 kg (118 lb)   LMP  (LMP Unknown)   SpO2 99%   BMI 24.45 kg/m    Body mass index is 24.45 kg/m .  Physical Exam   GENERAL: alert and no distress  NECK: no adenopathy, no asymmetry, masses, " or scars  RESP: lungs clear to auscultation - no rales, rhonchi or wheezes  CV: regular rate and rhythm, normal S1 S2, no S3 or S4, no murmur, click or rub, no peripheral edema  MS: no gross musculoskeletal defects noted, no edema    Results for orders placed or performed in visit on 03/29/24 (from the past 24 hour(s))   Hemoglobin A1c   Result Value Ref Range    Hemoglobin A1C 5.8 (H) 0.0 - 5.6 %           Signed Electronically by: JUN Arreguin CNP

## 2024-06-12 ENCOUNTER — PATIENT OUTREACH (OUTPATIENT)
Dept: CARE COORDINATION | Facility: CLINIC | Age: 64
End: 2024-06-12
Payer: COMMERCIAL

## 2024-06-24 ENCOUNTER — TELEPHONE (OUTPATIENT)
Dept: FAMILY MEDICINE | Facility: CLINIC | Age: 64
End: 2024-06-24
Payer: COMMERCIAL

## 2024-06-24 DIAGNOSIS — E11.9 TYPE 2 DIABETES MELLITUS WITHOUT COMPLICATION, WITHOUT LONG-TERM CURRENT USE OF INSULIN (H): Primary | ICD-10-CM

## 2024-06-24 NOTE — TELEPHONE ENCOUNTER
Patient is due for a diabetes follow up appointment with me.      Fasting labs due:  Lipid panel, A1C, Metabolic Panel, and Microalbumin    Orders were placed, please have lab work done before visit.      Please ask patient to bring in their glucometer so we can download the data.    Please call patient to schedule.  Xochilt Ro, CNP

## 2024-06-24 NOTE — TELEPHONE ENCOUNTER
Patient Quality Outreach    Patient is due for the following:   Diabetes -  Diabetic Follow-Up Visit  Patient is due for a diabetes follow up appointment with me.       Fasting labs due:  Lipid panel, A1C, Metabolic Panel, and Microalbumin  Next Steps:   Schedule a office visit for Diabetes and lab prior     Type of outreach:    Phone, left message for patient/parent to call back.      Questions for provider review:    None           Augustine Alejandro, CMA

## 2024-07-09 ENCOUNTER — PATIENT OUTREACH (OUTPATIENT)
Dept: CARE COORDINATION | Facility: CLINIC | Age: 64
End: 2024-07-09
Payer: COMMERCIAL

## 2024-07-14 ENCOUNTER — TELEPHONE (OUTPATIENT)
Dept: FAMILY MEDICINE | Facility: CLINIC | Age: 64
End: 2024-07-14
Payer: COMMERCIAL

## 2024-07-14 DIAGNOSIS — E78.5 HYPERLIPIDEMIA LDL GOAL <130: ICD-10-CM

## 2024-07-14 DIAGNOSIS — I10 BENIGN ESSENTIAL HYPERTENSION: ICD-10-CM

## 2024-07-15 NOTE — TELEPHONE ENCOUNTER
Patient has an appointment with me this week.  Does she have enough medication to last until her appointment?  If she does not, RN may send in 7 days worth.  If she does, I will address at her appointment.  Xochilt Ro, CNP

## 2024-07-16 RX ORDER — METOPROLOL SUCCINATE 50 MG/1
50 TABLET, EXTENDED RELEASE ORAL DAILY
Qty: 90 TABLET | Refills: 3 | OUTPATIENT
Start: 2024-07-16

## 2024-07-16 RX ORDER — HYDROCHLOROTHIAZIDE 12.5 MG/1
12.5 TABLET ORAL DAILY
Qty: 90 TABLET | Refills: 3 | OUTPATIENT
Start: 2024-07-16

## 2024-07-16 RX ORDER — LISINOPRIL 40 MG/1
40 TABLET ORAL DAILY
Qty: 90 TABLET | Refills: 3 | OUTPATIENT
Start: 2024-07-16

## 2024-07-16 RX ORDER — PRAVASTATIN SODIUM 20 MG
20 TABLET ORAL DAILY
Qty: 90 TABLET | Refills: 3 | OUTPATIENT
Start: 2024-07-16

## 2024-07-19 ENCOUNTER — OFFICE VISIT (OUTPATIENT)
Dept: FAMILY MEDICINE | Facility: CLINIC | Age: 64
End: 2024-07-19
Payer: COMMERCIAL

## 2024-07-19 ENCOUNTER — LAB (OUTPATIENT)
Dept: LAB | Facility: CLINIC | Age: 64
End: 2024-07-19
Payer: COMMERCIAL

## 2024-07-19 VITALS
TEMPERATURE: 97.6 F | SYSTOLIC BLOOD PRESSURE: 110 MMHG | HEART RATE: 56 BPM | HEIGHT: 58 IN | BODY MASS INDEX: 25.61 KG/M2 | OXYGEN SATURATION: 99 % | DIASTOLIC BLOOD PRESSURE: 62 MMHG | RESPIRATION RATE: 20 BRPM | WEIGHT: 122 LBS

## 2024-07-19 DIAGNOSIS — E11.9 TYPE 2 DIABETES MELLITUS WITHOUT COMPLICATION, WITHOUT LONG-TERM CURRENT USE OF INSULIN (H): Primary | ICD-10-CM

## 2024-07-19 DIAGNOSIS — Z12.31 VISIT FOR SCREENING MAMMOGRAM: ICD-10-CM

## 2024-07-19 DIAGNOSIS — E78.5 HYPERLIPIDEMIA LDL GOAL <130: ICD-10-CM

## 2024-07-19 DIAGNOSIS — I10 BENIGN ESSENTIAL HYPERTENSION: ICD-10-CM

## 2024-07-19 DIAGNOSIS — Z12.11 SCREEN FOR COLON CANCER: ICD-10-CM

## 2024-07-19 DIAGNOSIS — E11.9 TYPE 2 DIABETES MELLITUS WITHOUT COMPLICATION, WITHOUT LONG-TERM CURRENT USE OF INSULIN (H): ICD-10-CM

## 2024-07-19 DIAGNOSIS — E78.5 HYPERLIPIDEMIA LDL GOAL <130: Primary | ICD-10-CM

## 2024-07-19 LAB
ANION GAP SERPL CALCULATED.3IONS-SCNC: 12 MMOL/L (ref 7–15)
BUN SERPL-MCNC: 18.7 MG/DL (ref 8–23)
CALCIUM SERPL-MCNC: 9.7 MG/DL (ref 8.8–10.4)
CHLORIDE SERPL-SCNC: 103 MMOL/L (ref 98–107)
CHOLEST SERPL-MCNC: 286 MG/DL
CREAT SERPL-MCNC: 0.82 MG/DL (ref 0.51–0.95)
CREAT UR-MCNC: 64 MG/DL
EGFRCR SERPLBLD CKD-EPI 2021: 80 ML/MIN/1.73M2
FASTING STATUS PATIENT QL REPORTED: YES
FASTING STATUS PATIENT QL REPORTED: YES
GLUCOSE SERPL-MCNC: 110 MG/DL (ref 70–99)
HBA1C MFR BLD: 5.8 % (ref 0–5.6)
HCO3 SERPL-SCNC: 26 MMOL/L (ref 22–29)
HDLC SERPL-MCNC: 74 MG/DL
LDLC SERPL CALC-MCNC: 195 MG/DL
MICROALBUMIN UR-MCNC: <12 MG/L
MICROALBUMIN/CREAT UR: NORMAL MG/G{CREAT}
NONHDLC SERPL-MCNC: 212 MG/DL
POTASSIUM SERPL-SCNC: 4.4 MMOL/L (ref 3.4–5.3)
SODIUM SERPL-SCNC: 141 MMOL/L (ref 135–145)
TRIGL SERPL-MCNC: 84 MG/DL

## 2024-07-19 PROCEDURE — 82043 UR ALBUMIN QUANTITATIVE: CPT

## 2024-07-19 PROCEDURE — 82570 ASSAY OF URINE CREATININE: CPT

## 2024-07-19 PROCEDURE — 36415 COLL VENOUS BLD VENIPUNCTURE: CPT

## 2024-07-19 PROCEDURE — 80061 LIPID PANEL: CPT

## 2024-07-19 PROCEDURE — 83036 HEMOGLOBIN GLYCOSYLATED A1C: CPT

## 2024-07-19 PROCEDURE — G2211 COMPLEX E/M VISIT ADD ON: HCPCS | Performed by: NURSE PRACTITIONER

## 2024-07-19 PROCEDURE — 80048 BASIC METABOLIC PNL TOTAL CA: CPT

## 2024-07-19 PROCEDURE — 99214 OFFICE O/P EST MOD 30 MIN: CPT | Performed by: NURSE PRACTITIONER

## 2024-07-19 RX ORDER — METFORMIN HCL 500 MG
500 TABLET, EXTENDED RELEASE 24 HR ORAL
Qty: 90 TABLET | Refills: 3 | Status: SHIPPED | OUTPATIENT
Start: 2024-07-19

## 2024-07-19 RX ORDER — LISINOPRIL 40 MG/1
40 TABLET ORAL DAILY
Qty: 90 TABLET | Refills: 3 | Status: SHIPPED | OUTPATIENT
Start: 2024-07-19

## 2024-07-19 RX ORDER — METOPROLOL SUCCINATE 50 MG/1
50 TABLET, EXTENDED RELEASE ORAL DAILY
Qty: 90 TABLET | Refills: 3 | Status: SHIPPED | OUTPATIENT
Start: 2024-07-19

## 2024-07-19 RX ORDER — PRAVASTATIN SODIUM 40 MG
40 TABLET ORAL DAILY
Qty: 90 TABLET | Refills: 3 | Status: SHIPPED | OUTPATIENT
Start: 2024-07-19

## 2024-07-19 RX ORDER — HYDROCHLOROTHIAZIDE 12.5 MG/1
12.5 TABLET ORAL DAILY
Qty: 90 TABLET | Refills: 3 | Status: SHIPPED | OUTPATIENT
Start: 2024-07-19

## 2024-07-19 RX ORDER — PRAVASTATIN SODIUM 20 MG
20 TABLET ORAL DAILY
Qty: 90 TABLET | Refills: 3 | Status: SHIPPED | OUTPATIENT
Start: 2024-07-19 | End: 2024-07-19 | Stop reason: DRUGHIGH

## 2024-07-19 ASSESSMENT — PAIN SCALES - GENERAL: PAINLEVEL: NO PAIN (0)

## 2024-07-19 NOTE — PROGRESS NOTES
Assessment & Plan     Type 2 diabetes mellitus without complication, without long-term current use of insulin (H)  Well controlled.  Follow up in 6 months.  - metFORMIN (GLUCOPHAGE XR) 500 MG 24 hr tablet; Take 1 tablet (500 mg) by mouth daily (with dinner)    Benign essential hypertension  Well controlled.  Follow up in 6 months.  - hydroCHLOROthiazide 12.5 MG tablet; Take 1 tablet (12.5 mg) by mouth daily  - lisinopril (ZESTRIL) 40 MG tablet; Take 1 tablet (40 mg) by mouth daily  - metoprolol succinate ER (TOPROL XL) 50 MG 24 hr tablet; Take 1 tablet (50 mg) by mouth daily    Hyperlipidemia LDL goal <130  Well controlled.  - pravastatin (PRAVACHOL) 20 MG tablet; Take 1 tablet (20 mg) by mouth daily    Visit for screening mammogram  - MA Screening Bilateral w/ Pato; Future    Screen for colon cancer  - Fecal colorectal cancer screen FIT - Future (S+30); Future      The risks, benefits and treatment options of prescribed medications or other treatments have been discussed with the patient. The patient verbalized their understanding and should call or follow up if no improvement or if they develop further problems.  Xochilt Ro, LAURIE              Subjective   Flor is a 64 year old, presenting for the following health issues:  Diabetes and Health Maintenance (Will check with insurance regarding cost of immunizations.)        7/19/2024     7:53 AM   Additional Questions   Roomed by Britt GUZMAN CMA   Accompanied by self         7/19/2024     7:53 AM   Patient Reported Additional Medications   Patient reports taking the following new medications none     HPI       Diabetes Follow-up    How often are you checking your blood sugar? Three times daily  Blood sugar testing frequency justification:  Patient modifying lifestyle changes (diet, exercise) with blood sugars  What time of day are you checking your blood sugars (select all that apply)?  Before and after meals  Have you had any blood sugars above 200?  Yes  had a  cortisone injection in knee  Have you had any blood sugars below 70?  No  What symptoms do you notice when your blood sugar is low?  None  What concerns do you have today about your diabetes? None   Do you have any of these symptoms? (Select all that apply)  No numbness or tingling in feet.  No redness, sores or blisters on feet.  No complaints of excessive thirst.  No reports of blurry vision.  No significant changes to weight.  Have you had a diabetic eye exam in the last 12 months? No            Hyperlipidemia Follow-Up    Are you regularly taking any medication or supplement to lower your cholesterol?   Yes- pravastatin  Are you having muscle aches or other side effects that you think could be caused by your cholesterol lowering medication?  No    Hypertension Follow-up    Do you check your blood pressure regularly outside of the clinic? Yes   Are you following a low salt diet? Yes  Are your blood pressures ever more than 140 on the top number (systolic) OR more   than 90 on the bottom number (diastolic), for example 140/90? No-  only when at the Dr office    BP Readings from Last 2 Encounters:   07/19/24 110/62   03/29/24 122/62     Hemoglobin A1C (%)   Date Value   07/19/2024 5.8 (H)   03/29/2024 5.8 (H)   07/19/2006 5.0     LDL Cholesterol Calculated (mg/dL)   Date Value   07/12/2023 106 (H)   11/01/2021 106 (H)   11/20/2020 129 (H)   10/26/2019 128 (H)     How many servings of fruits and vegetables do you eat daily?  2-3  On average, how many sweetened beverages do you drink each day (Examples: soda, juice, sweet tea, etc.  Do NOT count diet or artificially sweetened beverages)?   1  How many days per week do you exercise enough to make your heart beat faster? 3 or less  How many minutes a day do you exercise enough to make your heart beat faster? 20 - 29  How many days per week do you miss taking your medication? 0        Review of Systems  Constitutional, HEENT, cardiovascular, pulmonary, gi and gu systems  "are negative, except as otherwise noted.      Objective    /62 (BP Location: Right arm, Patient Position: Sitting, Cuff Size: Adult Regular)   Pulse 56   Temp 97.6  F (36.4  C) (Tympanic)   Resp 20   Ht 1.48 m (4' 10.25\")   Wt 55.3 kg (122 lb)   LMP  (LMP Unknown)   SpO2 99%   BMI 25.28 kg/m    Body mass index is 25.28 kg/m .  Physical Exam   GENERAL: alert and no distress  NECK: no adenopathy, no asymmetry, masses, or scars  RESP: lungs clear to auscultation - no rales, rhonchi or wheezes  CV: regular rate and rhythm, normal S1 S2, no S3 or S4, no murmur, click or rub, no peripheral edema  ABDOMEN: soft, nontender, no hepatosplenomegaly, no masses and bowel sounds normal  MS: no gross musculoskeletal defects noted, no edema  Diabetic foot exam: normal DP and PT pulses, no trophic changes or ulcerative lesions, normal sensory exam, and normal monofilament exam    Results for orders placed or performed in visit on 07/19/24 (from the past 24 hour(s))   Hemoglobin A1c   Result Value Ref Range    Hemoglobin A1C 5.8 (H) 0.0 - 5.6 %           The longitudinal plan of care for the diagnosis(es)/condition(s) as documented were addressed during this visit. Due to the added complexity in care, I will continue to support Flor in the subsequent management and with ongoing continuity of care.    Signed Electronically by: JUN Arreguin CNP    "

## 2024-09-19 ENCOUNTER — TRANSFERRED RECORDS (OUTPATIENT)
Dept: HEALTH INFORMATION MANAGEMENT | Facility: CLINIC | Age: 64
End: 2024-09-19
Payer: COMMERCIAL

## 2024-09-19 LAB — RETINOPATHY: NEGATIVE

## 2024-10-22 ENCOUNTER — TELEPHONE (OUTPATIENT)
Dept: FAMILY MEDICINE | Facility: CLINIC | Age: 64
End: 2024-10-22
Payer: COMMERCIAL

## 2024-10-22 NOTE — TELEPHONE ENCOUNTER
Patient Quality Outreach    Patient is due for the following:   Colon Cancer Screening  Breast Cancer Screening - Mammogram  Physical Preventive Adult Physical      Topic Date Due    Pneumococcal Vaccine (1 of 2 - PCV) Never done    Zoster (Shingles) Vaccine (1 of 2) Never done    Flu Vaccine (1) 09/01/2024    COVID-19 Vaccine (3 - 2024-25 season) 09/01/2024       Next Steps:   Schedule a Adult Preventative/ mammogram/ return FIT kit    Type of outreach:    Sent letter.      Questions for provider review:    None           Britt Mott, CMA

## 2024-10-22 NOTE — LETTER
October 22, 2024    To  Flor Seth  [address] [address]    Your team at [facility] cares about your health. We have reviewed your chart and based on our findings; we are making the following recommendations to better manage your health.     You are in particular need of attention regarding the following:     Call or MyChart message your clinic to schedule a colonoscopy, schedule/ a FIT Test, or order a Cologuard test. If you are unsure what type of test you need, please call your clinic and speak to clinic staff.   Please call [phone] to schedule a colonoscopy.  Contact your clinic to schedule/ your FIT Test.   Schedule Annual MAMMOGRAPHY. [facility] scheduling number is [phone] or schedule in [employer] (self referral).  PREVENTATIVE VISIT: Physical    If you have already completed these items, please contact the clinic via phone or   [employer] so your care team can review and update your records. Thank you for   choosing [facility] for your healthcare needs. For any questions,   concerns, or to schedule an appointment please contact our clinic.    Healthy Regards,      Your [facility] Care Team

## 2024-11-15 ENCOUNTER — IMMUNIZATION (OUTPATIENT)
Dept: FAMILY MEDICINE | Facility: CLINIC | Age: 64
End: 2024-11-15
Payer: COMMERCIAL

## 2024-11-15 PROCEDURE — 90471 IMMUNIZATION ADMIN: CPT

## 2024-11-15 PROCEDURE — 90673 RIV3 VACCINE NO PRESERV IM: CPT

## 2024-12-18 ENCOUNTER — PATIENT OUTREACH (OUTPATIENT)
Dept: CARE COORDINATION | Facility: CLINIC | Age: 64
End: 2024-12-18
Payer: COMMERCIAL

## 2025-01-08 ENCOUNTER — PATIENT OUTREACH (OUTPATIENT)
Dept: CARE COORDINATION | Facility: CLINIC | Age: 65
End: 2025-01-08
Payer: COMMERCIAL

## 2025-01-20 ENCOUNTER — TELEPHONE (OUTPATIENT)
Dept: FAMILY MEDICINE | Facility: CLINIC | Age: 65
End: 2025-01-20
Payer: COMMERCIAL

## 2025-01-20 DIAGNOSIS — E11.9 TYPE 2 DIABETES MELLITUS WITHOUT COMPLICATION, WITHOUT LONG-TERM CURRENT USE OF INSULIN (H): Primary | ICD-10-CM

## 2025-01-20 NOTE — TELEPHONE ENCOUNTER
Patient is due for a diabetes follow up appointment with me.      Non-fasting labs due:  A1C    Orders were placed, please have lab work done before visit.      Please ask patient to bring in their glucometer so we can download the data.    Please call patient to schedule.        Patient also due for:    Colon Cancer Screening    Xochilt Ro, CNP

## 2025-01-20 NOTE — LETTER
January 28, 2025      Flor Seth  [address] [place] [address]        Dear Flor,       Your team at [facility] cares about your health. We have reviewed your chart and based on our findings; we are making the following recommendations to better manage your health.     You are in particular need of attention regarding the following:     Schedule a DIABETIC FOLLOW UP appointment for Office Visit. Patients with diabetes should see their provider regularly.  Call or MyChart message your clinic to schedule a colonoscopy, schedule/ a FIT Test, or order a Cologuard test. If you are unsure what type of test you need, please call your clinic and speak to clinic staff.   Patient is due for a diabetes follow up appointment with me.       Non-fasting labs due:  A1C     Orders were placed, please have lab work done before visit.       Please ask patient to bring in their glucometer so we can download the data.     Please call [phone] to schedule lab and office visit      Patient also due for:     Colon Cancer Screening     If you have already completed these items, please contact the clinic via phone or   [employer] so your care team can review and update your records. Thank you for   choosing [facility] for your healthcare needs. For any questions,   concerns, or to schedule an appointment please contact our clinic.    Sincerely,  JUN Arreguin CNP

## 2025-01-22 NOTE — TELEPHONE ENCOUNTER
Patient Quality Outreach    Patient is due for the following:   Diabetes -  A1C and Diabetic Follow-Up Visit  Colon Cancer Screening    Action(s) Taken:   Schedule a office visit for lab and diabetes follow up    Type of outreach:    Phone, left message for patient/parent to call back.    Questions for provider review:    None           Augustine Alejandro, CMA

## 2025-01-23 ENCOUNTER — PATIENT OUTREACH (OUTPATIENT)
Dept: CARE COORDINATION | Facility: CLINIC | Age: 65
End: 2025-01-23
Payer: COMMERCIAL

## 2025-03-03 ENCOUNTER — PATIENT OUTREACH (OUTPATIENT)
Dept: CARE COORDINATION | Facility: CLINIC | Age: 65
End: 2025-03-03
Payer: COMMERCIAL

## 2025-03-03 DIAGNOSIS — Z13.220 SCREENING FOR LIPID DISORDERS: Primary | ICD-10-CM

## 2025-03-03 NOTE — PROGRESS NOTES
"Flor Matias has an upcoming lab appointment with us. The appointment note says \"A1C and Cholesterol. She only has an A1C. Please review and place order if needed.  Thank you,  [facility]  "

## 2025-03-04 NOTE — PROGRESS NOTES
Ordered cholesterol   Covering for your clinician.  Thank you,  Kuldeep Garcia MD  [facility]

## 2025-06-05 ENCOUNTER — TELEPHONE (OUTPATIENT)
Dept: ORTHOPEDICS | Facility: CLINIC | Age: 65
End: 2025-06-05
Payer: COMMERCIAL

## 2025-06-05 DIAGNOSIS — M17.12 ARTHRITIS OF LEFT KNEE: Primary | ICD-10-CM

## 2025-06-05 NOTE — TELEPHONE ENCOUNTER
Patient scheduled for appointment on 6/19/2025 @ [facility] for discussion of viscosupplementation injection vs steroid injection of left knee.        Steroid  injection last completed 2/22/2024.  Patient reports relief for 6+ months    Patient has failed 3 month trial of Pharmacologic Approach (e.g., topical NSAIDs, oral NSAIDs with or without oral proton pump inhibitors, GARCIA-2 inhibitors, topical capsaicin, acetaminophen, tramadol, duloxetine, etc.):  Yes     Patient has completed 3 month trial of Non-Pharmacologic treatments (i.e., physical, psychosocial, or mind-body approach (e.g., exercise-land based or aquatic, physical therapy, eleazar chi, yoga, weight management, cognitive behavioral therapy, knee brace or cane, etc).  Yes    Has patient had prior reaction to Synvisc/SynviscOne or any alternative HA product?: No    Prior authorization referral for Durolane injection pended.    Please advise    Gene Mccall ATC

## 2025-06-12 NOTE — TELEPHONE ENCOUNTER
----- Message from Leighann CARDOZA sent at 6/11/2025 11:49 AM CDT -----  Regarding: Durolane  Hello,    Hyaluronic knee injections are not a covered benefit for this patient under her insurance plan - same as last year.    Thank you,  Leighann

## 2025-06-19 ENCOUNTER — OFFICE VISIT (OUTPATIENT)
Dept: ORTHOPEDICS | Facility: CLINIC | Age: 65
End: 2025-06-19
Payer: COMMERCIAL

## 2025-06-19 VITALS — WEIGHT: 120 LBS | BODY MASS INDEX: 25.19 KG/M2 | HEIGHT: 58 IN

## 2025-06-19 DIAGNOSIS — M25.562 CHRONIC PAIN OF LEFT KNEE: ICD-10-CM

## 2025-06-19 DIAGNOSIS — G89.29 CHRONIC PAIN OF LEFT KNEE: ICD-10-CM

## 2025-06-19 DIAGNOSIS — M25.462 KNEE EFFUSION, LEFT: ICD-10-CM

## 2025-06-19 DIAGNOSIS — M17.12 ARTHRITIS OF LEFT KNEE: Primary | ICD-10-CM

## 2025-06-19 PROCEDURE — 20611 DRAIN/INJ JOINT/BURSA W/US: CPT | Mod: LT | Performed by: FAMILY MEDICINE

## 2025-06-19 PROCEDURE — 99213 OFFICE O/P EST LOW 20 MIN: CPT | Mod: 25 | Performed by: FAMILY MEDICINE

## 2025-06-19 RX ADMIN — ROPIVACAINE HYDROCHLORIDE 3 ML: 5 INJECTION, SOLUTION EPIDURAL; INFILTRATION; PERINEURAL at 16:15

## 2025-06-19 RX ADMIN — TRIAMCINOLONE ACETONIDE 40 MG: 40 INJECTION, SUSPENSION INTRA-ARTICULAR; INTRAMUSCULAR at 16:15

## 2025-06-19 NOTE — LETTER
"2025      Flor Seth  [address] [address]      Dear Colleague,    Thank you for referring your patient, Flor Seth, to the [facility] [place]. Please see a copy of my visit note below.    Flor Seth  :  1960  DOS: 25  MRN: [id number]    Chief Complaint   Patient presents with     Left Knee - Pain, Follow Up, Procedure       Interim History - 2024  Since last visit on 23 patient has moderate-severe left knee pain & swelling over the past ~ 2+ months.  Left knee steroid injection completed on 23 provided relief for ~ 3+ months.  Patient is interested in repeat injection.  No new injury in the interim.    Interim History - 2025  Since last visit on 2024 patient has worsening moderate left deep lateral knee pain over the past 2 - 3 months.  Left knee steroid injection completed on 2024 provided relief for ~ 1+ years.  Patient notes increased pain with walking & bending her knee.  Patient would like repeat injection today.  No new injury in the interim.    REVIEW OF SYSTEMS:  Review of Systems   All other systems reviewed and are negative.      OBJECTIVE:  Ht 1.48 m (4' 10.25\")   Wt 54.4 kg (120 lb)   BMI 24.87 kg/m     General: healthy, alert and in no distress  Skin: no suspicious lesions or rash.  CV: distal perfusion intact   Resp: normal respiratory effort without conversational dyspnea   Psych: normal mood and affect  Gait: mildly antalgic  Neuro: Normal light sensory exam of upper extremity    Left Knee exam    ROM:        Flexion 130 degrees       Extension -2 degrees       Range of motion limited by pain, stiffness    Inspection:       no visible ecchymosis        effusion noted small    Skin:       no visible deformities       well perfused       capillary refill brisk    Patellar Motion:        Crepitus noted in the patellofemoral joint    Tender:        lateral patellar border    "    medial joint line most focal       lateral joint line    Non Tender:         remainder of knee area        along MCL        distal IT Band        infrapatellar tendon        tibial tubercle        pes anserine bursa    Special Tests:        neg (-) varus at 0 deg and 30 deg       neg (-) valgus at 0 deg and 30 deg    Evaluation of ipsilateral kinetic chain       B/l decreased quad tone and core deconditioning      RADIOLOGY:  Final results and radiologist's interpretation, available in the [facility] health record.  Images were reviewed with the patient in the office today.    XR KNEE BILATERAL 1/2 VIEWS 7/12/2023 10:02 AM     HISTORY: Chronic pain of both knees; Chronic pain of both knees;  Chronic pain of both knees     COMPARISON: None.                                                                      IMPRESSION: Right knee: No fracture or degenerative changes. No  effusion.     Left knee: No fracture. No effusion. Moderate degenerative changes in  the medial compartment.      Large Joint Injection/Arthocentesis    Date/Time: 6/19/2025 4:15 PM    Performed by: Salvador Jaimes DO  Authorized by: Salvador Jaimes DO    Indications:  Pain  Guidance: ultrasound        Medications:  40 mg triamcinolone 40 MG/ML; 3 mL ROPivacaine 5 MG/ML  Aspirate amount (mL):  8  Aspirate:  Serous and yellow  Outcome:  Tolerated well, no immediate complications  Procedure discussed: discussed risks, benefits, and alternatives    Consent Given by:  Patient  Timeout: timeout called immediately prior to procedure    Prep: patient was prepped and draped in usual sterile fashion     Ultrasound images of procedure were permanently stored.     ASSESSMENT & PLAN  (M17.12) Arthritis of left knee  (primary encounter diagnosis)  (M25.562,  G89.29) Chronic pain of left knee  (M25.462) Knee effusion, left      Plan:  Follow up prn based on short term clinical progress  Good relief from prior CSI, pain has now acutely flared and  not improving with supportive measures  XR images independently visualized and reviewed with patient today in clinic  Reviewed wt loss, activity modification and progressive increase in activity as tolerated and guided by pain  Reviewed options for potential steroid vs viscosupplementation injections and the possibility for future orthopedic referral prn  Reviewed safe and appropriate OTC medication choices, try tylenol first  Up to 3000mg daily of tylenol is generally safe, NSAID dosing and duration limitations reviewed, consider topical Voltaren gel  Discussed nature of degenerative arthrosis of the knee.   Discussed symptom treatment with ice or heat, topical treatments, and rest if needed.   After review of the alternatives, relative risks, goals and limitations of corticosteroid injection, the patient elected to proceed with US guided left knee aspiration and CSI  Low impact activity strategies reviewed and basic HEP reviewed, PT options reviewed  Expectations and goals of CSI reviewed  Often 2-3 days for steroid effect, and can take up to two weeks for maximum effect  We discussed modified progressive pain-free activity as tolerated  Do not overuse in first two weeks if feeling better due to concern for vulnerability while steroid is working  Supportive care reviewed  All questions were answered today  Contact us with additional questions or concerns  Signs and sx of concern reviewed      Salvador Jaimes DO, CAQ  Sports Medicine Physician  [facility]       Again, thank you for allowing me to participate in the care of your patient.        Sincerely,        Salvador Jaimes DO    Electronically signed

## 2025-06-19 NOTE — PROGRESS NOTES
"Flor Seth  :  1960  DOS: 25  MRN: [id number]    Chief Complaint   Patient presents with    Left Knee - Pain, Follow Up, Procedure       Interim History - 2024  Since last visit on 23 patient has moderate-severe left knee pain & swelling over the past ~ 2+ months.  Left knee steroid injection completed on 23 provided relief for ~ 3+ months.  Patient is interested in repeat injection.  No new injury in the interim.    Interim History - 2025  Since last visit on 2024 patient has worsening moderate left deep lateral knee pain over the past 2 - 3 months.  Left knee steroid injection completed on 2024 provided relief for ~ 1+ years.  Patient notes increased pain with walking & bending her knee.  Patient would like repeat injection today.  No new injury in the interim.    REVIEW OF SYSTEMS:  Review of Systems   All other systems reviewed and are negative.      OBJECTIVE:  Ht 1.48 m (4' 10.25\")   Wt 54.4 kg (120 lb)   BMI 24.87 kg/m     General: healthy, alert and in no distress  Skin: no suspicious lesions or rash.  CV: distal perfusion intact   Resp: normal respiratory effort without conversational dyspnea   Psych: normal mood and affect  Gait: mildly antalgic  Neuro: Normal light sensory exam of upper extremity    Left Knee exam    ROM:        Flexion 130 degrees       Extension -2 degrees       Range of motion limited by pain, stiffness    Inspection:       no visible ecchymosis        effusion noted small    Skin:       no visible deformities       well perfused       capillary refill brisk    Patellar Motion:        Crepitus noted in the patellofemoral joint    Tender:        lateral patellar border       medial joint line most focal       lateral joint line    Non Tender:         remainder of knee area        along MCL        distal IT Band        infrapatellar tendon        tibial tubercle        pes anserine bursa    Special Tests:        neg (-) varus " at 0 deg and 30 deg       neg (-) valgus at 0 deg and 30 deg    Evaluation of ipsilateral kinetic chain       B/l decreased quad tone and core deconditioning      RADIOLOGY:  Final results and radiologist's interpretation, available in the [facility] health record.  Images were reviewed with the patient in the office today.    XR KNEE BILATERAL 1/2 VIEWS 7/12/2023 10:02 AM     HISTORY: Chronic pain of both knees; Chronic pain of both knees;  Chronic pain of both knees     COMPARISON: None.                                                                      IMPRESSION: Right knee: No fracture or degenerative changes. No  effusion.     Left knee: No fracture. No effusion. Moderate degenerative changes in  the medial compartment.      Large Joint Injection/Arthocentesis    Date/Time: 6/19/2025 4:15 PM    Performed by: Salvador Jaimes DO  Authorized by: Salvador Jaimes DO    Indications:  Pain  Guidance: ultrasound        Medications:  40 mg triamcinolone 40 MG/ML; 3 mL ROPivacaine 5 MG/ML  Aspirate amount (mL):  8  Aspirate:  Serous and yellow  Outcome:  Tolerated well, no immediate complications  Procedure discussed: discussed risks, benefits, and alternatives    Consent Given by:  Patient  Timeout: timeout called immediately prior to procedure    Prep: patient was prepped and draped in usual sterile fashion     Ultrasound images of procedure were permanently stored.     ASSESSMENT & PLAN  (M17.12) Arthritis of left knee  (primary encounter diagnosis)  (M25.562,  G89.29) Chronic pain of left knee  (M25.462) Knee effusion, left      Plan:  Follow up prn based on short term clinical progress  Good relief from prior CSI, pain has now acutely flared and not improving with supportive measures  XR images independently visualized and reviewed with patient today in clinic  Reviewed wt loss, activity modification and progressive increase in activity as tolerated and guided by pain  Reviewed options for potential  steroid vs viscosupplementation injections and the possibility for future orthopedic referral prn  Reviewed safe and appropriate OTC medication choices, try tylenol first  Up to 3000mg daily of tylenol is generally safe, NSAID dosing and duration limitations reviewed, consider topical Voltaren gel  Discussed nature of degenerative arthrosis of the knee.   Discussed symptom treatment with ice or heat, topical treatments, and rest if needed.   After review of the alternatives, relative risks, goals and limitations of corticosteroid injection, the patient elected to proceed with US guided left knee aspiration and CSI  Low impact activity strategies reviewed and basic HEP reviewed, PT options reviewed  Expectations and goals of CSI reviewed  Often 2-3 days for steroid effect, and can take up to two weeks for maximum effect  We discussed modified progressive pain-free activity as tolerated  Do not overuse in first two weeks if feeling better due to concern for vulnerability while steroid is working  Supportive care reviewed  All questions were answered today  Contact us with additional questions or concerns  Signs and sx of concern reviewed      Salvador Jaimes DO, LIU  Sports Medicine Physician  [facility]

## 2025-06-22 RX ORDER — ROPIVACAINE HYDROCHLORIDE 5 MG/ML
3 INJECTION, SOLUTION EPIDURAL; INFILTRATION; PERINEURAL
Status: COMPLETED | OUTPATIENT
Start: 2025-06-19 | End: 2025-06-19

## 2025-06-22 RX ORDER — TRIAMCINOLONE ACETONIDE 40 MG/ML
40 INJECTION, SUSPENSION INTRA-ARTICULAR; INTRAMUSCULAR
Status: COMPLETED | OUTPATIENT
Start: 2025-06-19 | End: 2025-06-19

## 2025-06-26 ENCOUNTER — OFFICE VISIT (OUTPATIENT)
Dept: FAMILY MEDICINE | Facility: CLINIC | Age: 65
End: 2025-06-26
Payer: COMMERCIAL

## 2025-06-26 VITALS
DIASTOLIC BLOOD PRESSURE: 60 MMHG | WEIGHT: 125 LBS | BODY MASS INDEX: 26.24 KG/M2 | HEIGHT: 58 IN | RESPIRATION RATE: 12 BRPM | SYSTOLIC BLOOD PRESSURE: 126 MMHG | HEART RATE: 76 BPM | OXYGEN SATURATION: 100 % | TEMPERATURE: 97.3 F

## 2025-06-26 DIAGNOSIS — M79.671 RIGHT FOOT PAIN: Primary | ICD-10-CM

## 2025-06-26 DIAGNOSIS — Z12.31 ENCOUNTER FOR SCREENING MAMMOGRAM FOR BREAST CANCER: ICD-10-CM

## 2025-06-26 DIAGNOSIS — Z12.11 SCREEN FOR COLON CANCER: ICD-10-CM

## 2025-06-26 DIAGNOSIS — I78.1 ASYMPTOMATIC SPIDER VEIN: ICD-10-CM

## 2025-06-26 ASSESSMENT — PAIN SCALES - GENERAL: PAINLEVEL_OUTOF10: MILD PAIN (2)

## 2025-06-26 NOTE — PROGRESS NOTES
Assessment & Plan     Right foot pain  Xray today to eval tender nodularity of 5th metatarsal.  - XR Foot Right G/E 3 Views; Future    Asymptomatic spider vein  Discussed with patient that as spider veins are mild and asymptomatic, any intervention would be considered cosmetic.  She will monitor for now.    Screen for colon cancer  - Fecal colorectal cancer screen FIT - Future (S+30); Future    Encounter for screening mammogram for breast cancer  - MA Screen Bilateral w/Pato; Future    The risks, benefits and treatment options of prescribed medications or other treatments have been discussed with the patient. The patient verbalized their understanding and should call or follow up if no improvement or if they develop further problems.  Xochilt Ro, LAURIE            Subjective   Flor is a 65 year old, presenting for the following health issues:  Musculoskeletal Problem (Concern regarding veins in legs.) and Health Maintenance (Patient scheduled for annual physical/med refills in July)        6/26/2025     1:03 PM   Additional Questions   Roomed by Britt GUZMAN CMA   Accompanied by self         6/26/2025     1:03 PM   Patient Reported Additional Medications   Patient reports taking the following new medications none     History of Present Illness       Reason for visit:  Spider veins- behind knee, bilateral  Symptom onset:  More than a month  Symptoms include:  Denies pain   She is taking medications regularly.    Spider veins are asymptomatic - no pain or leg fatigue.  She doesn't like how they look.          Also c/o of pain in right foot that comes and goes  Sharp pain when present  No specific trauma  Top of foot  Tylenol helps   Bio freeze also helps a little bit              Review of Systems  Constitutional, HEENT, cardiovascular, pulmonary, gi and gu systems are negative, except as otherwise noted.      Objective    /60 (BP Location: Right arm, Patient Position: Sitting, Cuff Size: Adult Regular)    "Pulse 76   Temp 97.3  F (36.3  C) (Tympanic)   Resp 12   Ht 1.473 m (4' 10\")   Wt 56.7 kg (125 lb)   LMP  (LMP Unknown)   SpO2 100%   BMI 26.13 kg/m    Body mass index is 26.13 kg/m .  Physical Exam   GENERAL: alert and no distress  MS: right foot - tender nodularity at the 5th proximal metatarsal head.  SKIN: mild spider veins noted behind both knees.            Signed Electronically by: JUN Arreguin CNP    "

## 2025-06-27 ENCOUNTER — RESULTS FOLLOW-UP (OUTPATIENT)
Dept: FAMILY MEDICINE | Facility: CLINIC | Age: 65
End: 2025-06-27
Payer: COMMERCIAL

## 2025-06-27 DIAGNOSIS — M79.671 RIGHT FOOT PAIN: Primary | ICD-10-CM

## 2025-06-27 NOTE — TELEPHONE ENCOUNTER
Pt received foot xray results.    Pt says she has been having intermittent right foot pain for about 3 months but when it hurts, it really hurts.      Pt asks what to do next?  Is there medication to take?  Give it more time?  See specialist?    Pt asks to be called because her MyChart is not working well today.    Yulissa Rojas RN

## 2025-06-30 NOTE — TELEPHONE ENCOUNTER
Patient Returning Call    Reason for call:  Patient returning call     Information relayed to patient:  Relayed provider's result note and provided patient with Podiatry scheduling number     Patient has additional questions:  No      Hermes Monteiro, Clinic RN  [facility]

## 2025-07-01 ENCOUNTER — PATIENT OUTREACH (OUTPATIENT)
Dept: CARE COORDINATION | Facility: CLINIC | Age: 65
End: 2025-07-01
Payer: COMMERCIAL

## 2025-07-03 ENCOUNTER — PATIENT OUTREACH (OUTPATIENT)
Dept: CARE COORDINATION | Facility: CLINIC | Age: 65
End: 2025-07-03
Payer: COMMERCIAL

## 2025-07-07 ENCOUNTER — TELEPHONE (OUTPATIENT)
Dept: FAMILY MEDICINE | Facility: CLINIC | Age: 65
End: 2025-07-07
Payer: COMMERCIAL

## 2025-07-10 ENCOUNTER — ORDERS ONLY (AUTO-RELEASED) (OUTPATIENT)
Dept: FAMILY MEDICINE | Facility: CLINIC | Age: 65
End: 2025-07-10
Payer: COMMERCIAL

## 2025-07-12 ENCOUNTER — HEALTH MAINTENANCE LETTER (OUTPATIENT)
Age: 65
End: 2025-07-12

## 2025-07-16 ENCOUNTER — OFFICE VISIT (OUTPATIENT)
Dept: FAMILY MEDICINE | Facility: CLINIC | Age: 65
End: 2025-07-16
Payer: COMMERCIAL

## 2025-07-16 VITALS
OXYGEN SATURATION: 98 % | RESPIRATION RATE: 12 BRPM | HEIGHT: 58 IN | TEMPERATURE: 97.9 F | HEART RATE: 65 BPM | WEIGHT: 122 LBS | DIASTOLIC BLOOD PRESSURE: 70 MMHG | BODY MASS INDEX: 25.61 KG/M2 | SYSTOLIC BLOOD PRESSURE: 118 MMHG

## 2025-07-16 DIAGNOSIS — E11.9 TYPE 2 DIABETES MELLITUS WITHOUT COMPLICATION, WITHOUT LONG-TERM CURRENT USE OF INSULIN (H): ICD-10-CM

## 2025-07-16 DIAGNOSIS — Z12.11 SCREEN FOR COLON CANCER: ICD-10-CM

## 2025-07-16 DIAGNOSIS — Z12.31 VISIT FOR SCREENING MAMMOGRAM: ICD-10-CM

## 2025-07-16 DIAGNOSIS — I10 BENIGN ESSENTIAL HYPERTENSION: ICD-10-CM

## 2025-07-16 DIAGNOSIS — E78.5 HYPERLIPIDEMIA LDL GOAL <130: ICD-10-CM

## 2025-07-16 DIAGNOSIS — Z00.00 ROUTINE GENERAL MEDICAL EXAMINATION AT A HEALTH CARE FACILITY: Primary | ICD-10-CM

## 2025-07-16 LAB
ANION GAP SERPL CALCULATED.3IONS-SCNC: 13 MMOL/L (ref 7–15)
BUN SERPL-MCNC: 22.2 MG/DL (ref 8–23)
CALCIUM SERPL-MCNC: 10.5 MG/DL (ref 8.8–10.4)
CHLORIDE SERPL-SCNC: 105 MMOL/L (ref 98–107)
CREAT SERPL-MCNC: 0.84 MG/DL (ref 0.51–0.95)
CREAT UR-MCNC: 95 MG/DL
EGFRCR SERPLBLD CKD-EPI 2021: 77 ML/MIN/1.73M2
EST. AVERAGE GLUCOSE BLD GHB EST-MCNC: 120 MG/DL
GLUCOSE SERPL-MCNC: 110 MG/DL (ref 70–99)
HBA1C MFR BLD: 5.8 % (ref 0–5.6)
HCO3 SERPL-SCNC: 25 MMOL/L (ref 22–29)
MICROALBUMIN UR-MCNC: <12 MG/L
MICROALBUMIN/CREAT UR: NORMAL MG/G{CREAT}
POTASSIUM SERPL-SCNC: 4.2 MMOL/L (ref 3.4–5.3)
SODIUM SERPL-SCNC: 143 MMOL/L (ref 135–145)

## 2025-07-16 PROCEDURE — 99396 PREV VISIT EST AGE 40-64: CPT | Performed by: NURSE PRACTITIONER

## 2025-07-16 PROCEDURE — 82570 ASSAY OF URINE CREATININE: CPT | Performed by: NURSE PRACTITIONER

## 2025-07-16 PROCEDURE — 3078F DIAST BP <80 MM HG: CPT | Performed by: NURSE PRACTITIONER

## 2025-07-16 PROCEDURE — 83036 HEMOGLOBIN GLYCOSYLATED A1C: CPT | Performed by: NURSE PRACTITIONER

## 2025-07-16 PROCEDURE — 3074F SYST BP LT 130 MM HG: CPT | Performed by: NURSE PRACTITIONER

## 2025-07-16 PROCEDURE — 80048 BASIC METABOLIC PNL TOTAL CA: CPT | Performed by: NURSE PRACTITIONER

## 2025-07-16 PROCEDURE — 1126F AMNT PAIN NOTED NONE PRSNT: CPT | Performed by: NURSE PRACTITIONER

## 2025-07-16 PROCEDURE — 82043 UR ALBUMIN QUANTITATIVE: CPT | Performed by: NURSE PRACTITIONER

## 2025-07-16 PROCEDURE — 36415 COLL VENOUS BLD VENIPUNCTURE: CPT | Performed by: NURSE PRACTITIONER

## 2025-07-16 PROCEDURE — 99214 OFFICE O/P EST MOD 30 MIN: CPT | Mod: 25 | Performed by: NURSE PRACTITIONER

## 2025-07-16 RX ORDER — METOPROLOL SUCCINATE 50 MG/1
50 TABLET, EXTENDED RELEASE ORAL DAILY
Qty: 90 TABLET | Refills: 3 | Status: SHIPPED | OUTPATIENT
Start: 2025-07-16

## 2025-07-16 RX ORDER — METFORMIN HYDROCHLORIDE 500 MG/1
500 TABLET, EXTENDED RELEASE ORAL
Qty: 90 TABLET | Refills: 3 | Status: SHIPPED | OUTPATIENT
Start: 2025-07-16

## 2025-07-16 RX ORDER — LISINOPRIL 40 MG/1
40 TABLET ORAL DAILY
Qty: 90 TABLET | Refills: 3 | Status: SHIPPED | OUTPATIENT
Start: 2025-07-16

## 2025-07-16 RX ORDER — PRAVASTATIN SODIUM 40 MG
40 TABLET ORAL DAILY
Qty: 90 TABLET | Refills: 3 | Status: SHIPPED | OUTPATIENT
Start: 2025-07-16

## 2025-07-16 RX ORDER — HYDROCHLOROTHIAZIDE 12.5 MG/1
12.5 TABLET ORAL DAILY
Qty: 90 TABLET | Refills: 3 | Status: SHIPPED | OUTPATIENT
Start: 2025-07-16

## 2025-07-16 SDOH — HEALTH STABILITY: PHYSICAL HEALTH: ON AVERAGE, HOW MANY DAYS PER WEEK DO YOU ENGAGE IN MODERATE TO STRENUOUS EXERCISE (LIKE A BRISK WALK)?: 5 DAYS

## 2025-07-16 SDOH — HEALTH STABILITY: PHYSICAL HEALTH: ON AVERAGE, HOW MANY MINUTES DO YOU ENGAGE IN EXERCISE AT THIS LEVEL?: 60 MIN

## 2025-07-16 ASSESSMENT — SOCIAL DETERMINANTS OF HEALTH (SDOH): HOW OFTEN DO YOU GET TOGETHER WITH FRIENDS OR RELATIVES?: TWICE A WEEK

## 2025-07-16 ASSESSMENT — PAIN SCALES - GENERAL: PAINLEVEL_OUTOF10: NO PAIN (0)

## 2025-07-16 NOTE — PROGRESS NOTES
"Preventive Care Visit  [facility]  JUN Arreguin CNP, Family Medicine  Jul 16, 2025        Assessment & Plan     Routine general medical examination at a health care facility    Type 2 diabetes mellitus without complication, without long-term current use of insulin (H)  Well controlled  Follow up every 6 months.  - metFORMIN (GLUCOPHAGE XR) 500 MG 24 hr tablet; Take 1 tablet (500 mg) by mouth daily (with dinner).  - HEMOGLOBIN A1C; Future  - BASIC METABOLIC PANEL; Future  - Albumin Random Urine Quantitative with Creat Ratio; Future  - OFFICE/OUTPT VISIT,EST,LEVL IV    Benign essential hypertension  Well controlled.  - hydroCHLOROthiazide 12.5 MG tablet; Take 1 tablet (12.5 mg) by mouth daily.  - lisinopril (ZESTRIL) 40 MG tablet; Take 1 tablet (40 mg) by mouth daily.  - metoprolol succinate ER (TOPROL XL) 50 MG 24 hr tablet; Take 1 tablet (50 mg) by mouth daily.  - OFFICE/OUTPT VISIT,EST,LEVL IV    Hyperlipidemia LDL goal <130  Well controlled.  - pravastatin (PRAVACHOL) 40 MG tablet; Take 1 tablet (40 mg) by mouth daily.  - OFFICE/OUTPT VISIT,EST,LEVL IV    Screen for colon cancer  - Fecal colorectal cancer screen FIT - Future (S+30); Future    Visit for screening mammogram  - MA Screening Bilateral w/ Pato; Future      BMI  Estimated body mass index is 25.5 kg/m  as calculated from the following:    Height as of this encounter: 1.473 m (4' 10\").    Weight as of this encounter: 55.3 kg (122 lb).       Counseling  Appropriate preventive services were addressed with this patient via screening, questionnaire, or discussion as appropriate for fall prevention, nutrition, physical activity, Tobacco-use cessation, social engagement, weight loss and cognition.  Checklist reviewing preventive services available has been given to the patient.  Reviewed patient's diet, addressing concerns and/or questions.       The risks, benefits and treatment options of prescribed medications or other " treatments have been discussed with the patient. The patient verbalized their understanding and should call or follow up if no improvement or if they develop further problems.  LAURIE Arreguin   Flor is a 65 year old, presenting for the following:  Wellness Visit (And refill medications) and Health Maintenance (Patient declined vaccines today)        7/16/2025     8:28 AM   Additional Questions   Roomed by Britt GUZMAN   Accompanied by self         7/16/2025     8:28 AM   Patient Reported Additional Medications   Patient reports taking the following new medications none          HPI       Diabetes Follow-up    How often are you checking your blood sugar? A few times a week  What time of day are you checking your blood sugars (select all that apply)?  Before and after meals  Have you had any blood sugars above 200?  No  Have you had any blood sugars below 70?  No  What symptoms do you notice when your blood sugar is low?  None  What concerns do you have today about your diabetes? None   Do you have any of these symptoms? (Select all that apply)  No numbness or tingling in feet.  No redness, sores or blisters on feet.  No complaints of excessive thirst.  No reports of blurry vision.  No significant changes to weight.          Hyperlipidemia Follow-Up    Are you regularly taking any medication or supplement to lower your cholesterol?   Yes- pravastatin  Are you having muscle aches or other side effects that you think could be caused by your cholesterol lowering medication?  No    Hypertension Follow-up    Do you check your blood pressure regularly outside of the clinic? Yes   Are you following a low salt diet? Yes  Are your blood pressures ever more than 140 on the top number (systolic) OR more   than 90 on the bottom number (diastolic), for example 140/90? No    BP Readings from Last 2 Encounters:   07/16/25 118/70   06/26/25 126/60     Hemoglobin A1C (%)   Date Value   03/07/2025 6.0 (H)    07/19/2024 5.8 (H)   07/19/2006 5.0     LDL Cholesterol Calculated (mg/dL)   Date Value   03/07/2025 132 (H)   07/19/2024 195 (H)   11/20/2020 129 (H)   10/26/2019 128 (H)         Advance Care Planning    Discussed advance care planning with patient; informed AVS has link to [facility].        7/16/2025   General Health   How would you rate your overall physical health? Excellent   Feel stress (tense, anxious, or unable to sleep) Not at all         7/16/2025   Nutrition   Three or more servings of calcium each day? Yes   Diet: Vegetarian/vegan    Gluten-free/reduced   How many servings of fruit and vegetables per day? (!) 2-3   How many sweetened beverages each day? 0-1       Multiple values from one day are sorted in reverse-chronological order         7/16/2025   Exercise   Days per week of moderate/strenous exercise 5 days   Average minutes spent exercising at this level 60 min         7/16/2025   Social Factors   Frequency of gathering with friends or relatives Twice a week   Worry food won't last until get money to buy more No   Food not last or not have enough money for food? No   Do you have housing? (Housing is defined as stable permanent housing and does not include staying outside in a car, in a tent, in an abandoned building, in an overnight shelter, or couch-surfing.) Yes   Are you worried about losing your housing? No   Lack of transportation? No   Unable to get utilities (heat,electricity)? No         7/16/2025   Fall Risk   Fallen 2 or more times in the past year? No    Trouble with walking or balance? No        Proxy-reported          7/16/2025   Dental   Dentist two times every year? Yes           Today's PHQ-2 Score:       7/16/2025     8:27 AM   PHQ-2 ( 1999 [employer])   Q1: Little interest or pleasure in doing things 0   Q2: Feeling down, depressed or hopeless 0   PHQ-2 Score 0         7/16/2025   Substance Use   Alcohol more than 3/day or more than 7/wk Not Applicable   Do you use any other  substances recreationally? No     Social History     Tobacco Use    Smoking status: Never    Smokeless tobacco: Never   Vaping Use    Vaping status: Never Used   Substance Use Topics    Alcohol use: No    Drug use: No           7/27/2023   LAST FHS-7 RESULTS   1st degree relative breast or ovarian cancer No   Any relative bilateral breast cancer No   Any male have breast cancer No   Any ONE woman have BOTH breast AND ovarian cancer No   Any woman with breast cancer before 50yrs No   2 or more relatives with breast AND/OR ovarian cancer No   2 or more relatives with breast AND/OR bowel cancer No                7/16/2025   STI Screening   New sexual partner(s) since last STI/HIV test? No     History of abnormal Pap smear: No - age 30- 64 PAP with HPV every 5 years recommended        Latest Ref Rng & Units 7/12/2023     9:34 AM 11/27/2017     8:15 AM 11/27/2017     8:14 AM   PAP / HPV   PAP  Negative for Intraepithelial Lesion or Malignancy (NILM)      PAP (Historical)    NIL    HPV 16 DNA Negative Negative  Negative     HPV 18 DNA Negative Negative  Negative     Other HR HPV Negative Negative  Negative       ASCVD Risk   The 10-year ASCVD risk score (Griselda ORTIZ, et al., 2019) is: 10.3%    Values used to calculate the score:      Age: 65 years      Sex: Female      Is Non- : No      Diabetic: Yes      Tobacco smoker: No      Systolic Blood Pressure: 118 mmHg      Is BP treated: Yes      HDL Cholesterol: 71 mg/dL      Total Cholesterol: 227 mg/dL           Reviewed and updated as needed this visit by Provider                          Review of Systems  Constitutional, HEENT, cardiovascular, pulmonary, GI, , musculoskeletal, neuro, skin, endocrine and psych systems are negative, except as otherwise noted.     Objective    Exam  /70 (BP Location: Right arm, Patient Position: Sitting, Cuff Size: Adult Regular)   Pulse 65   Temp 97.9  F (36.6  C) (Tympanic)   Resp 12   Ht 1.473 m  "(4' 10\")   Wt 55.3 kg (122 lb)   LMP  (LMP Unknown)   SpO2 98%   BMI 25.50 kg/m     Estimated body mass index is 25.5 kg/m  as calculated from the following:    Height as of this encounter: 1.473 m (4' 10\").    Weight as of this encounter: 55.3 kg (122 lb).    Physical Exam  GENERAL: alert and no distress  HENT: ear canals and TM's normal, nose and mouth without ulcers or lesions  NECK: no adenopathy, no asymmetry, masses, or scars  RESP: lungs clear to auscultation - no rales, rhonchi or wheezes  BREAST: normal without masses, tenderness or nipple discharge and no palpable axillary masses or adenopathy  CV: regular rate and rhythm, normal S1 S2, no S3 or S4, no murmur, click or rub, no peripheral edema  ABDOMEN: soft, nontender, no hepatosplenomegaly, no masses and bowel sounds normal  MS: no gross musculoskeletal defects noted, no edema  NEURO: Normal strength and tone, mentation intact and speech normal  PSYCH: mentation appears normal, affect normal/bright  Diabetic foot exam: normal DP and PT pulses, no trophic changes or ulcerative lesions, normal sensory exam, and normal monofilament exam        Signed Electronically by: JUN Arreguin CNP    "

## 2025-07-16 NOTE — PATIENT INSTRUCTIONS
Patient Education   Preventive Care Advice   This is general advice given by our system to help you stay healthy. However, your care team may have specific advice just for you. Please talk to your care team about your preventive care needs.  Nutrition  Eat 5 or more servings of fruits and vegetables each day.  Try wheat bread, brown rice and whole grain pasta (instead of white bread, rice, and pasta).  Get enough calcium and vitamin D. Check the label on foods and aim for 100% of the RDA (recommended daily allowance).  Lifestyle  Exercise at least 150 minutes each week  (30 minutes a day, 5 days a week).  Do muscle strengthening activities 2 days a week. These help control your weight and prevent disease.  No smoking.  Wear sunscreen to prevent skin cancer.  Have a dental exam and cleaning every 6 months.  Yearly exams  See your health care team every year to talk about:  Any changes in your health.  Any medicines your care team has prescribed.  Preventive care, family planning, and ways to prevent chronic diseases.  Shots (vaccines)   HPV shots (up to age 26), if you've never had them before.  Hepatitis B shots (up to age 59), if you've never had them before.  COVID-19 shot: Get this shot when it's due.  Flu shot: Get a flu shot every year.  Tetanus shot: Get a tetanus shot every 10 years.  Pneumococcal, hepatitis A, and RSV shots: Ask your care team if you need these based on your risk.  Shingles shot (for age 50 and up)  General health tests  Diabetes screening:  Starting at age 35, Get screened for diabetes at least every 3 years.  If you are younger than age 35, ask your care team if you should be screened for diabetes.  Cholesterol test: At age 39, start having a cholesterol test every 5 years, or more often if advised.  Bone density scan (DEXA): At age 50, ask your care team if you should have this scan for osteoporosis (brittle bones).  Hepatitis C: Get tested at least once in your life.  STIs (sexually  transmitted infections)  Before age 24: Ask your care team if you should be screened for STIs.  After age 24: Get screened for STIs if you're at risk. You are at risk for STIs (including HIV) if:  You are sexually active with more than one person.  You don't use condoms every time.  You or a partner was diagnosed with a sexually transmitted infection.  If you are at risk for HIV, ask about PrEP medicine to prevent HIV.  Get tested for HIV at least once in your life, whether you are at risk for HIV or not.  Cancer screening tests  Cervical cancer screening: If you have a cervix, begin getting regular cervical cancer screening tests starting at age 21.  Breast cancer scan (mammogram): If you've ever had breasts, begin having regular mammograms starting at age 40. This is a scan to check for breast cancer.  Colon cancer screening: It is important to start screening for colon cancer at age 45.  Have a colonoscopy test every 10 years (or more often if you're at risk) Or, ask your provider about stool tests like a FIT test every year or Cologuard test every 3 years.  To learn more about your testing options, visit:   .  For help making a decision, visit:   https://bit.ly/yj32051.  Prostate cancer screening test: If you have a prostate, ask your care team if a prostate cancer screening test (PSA) at age 55 is right for you.  Lung cancer screening: If you are a current or former smoker ages 50 to 80, ask your care team if ongoing lung cancer screenings are right for you.  For informational purposes only. Not to replace the advice of your health care provider. Copyright   2023 [employer]. All rights reserved. Clinically reviewed by the [facility] Transitions Program. [employer] 128971 - REV 01/24.

## 2025-07-30 ENCOUNTER — ORDERS ONLY (AUTO-RELEASED) (OUTPATIENT)
Dept: FAMILY MEDICINE | Facility: CLINIC | Age: 65
End: 2025-07-30
Payer: COMMERCIAL

## (undated) DEVICE — SOL NACL 0.9% IRRIG 1000ML BOTTLE 07138-09

## (undated) DEVICE — ROBO TIP

## (undated) DEVICE — SOL WATER IRRIG 1000ML BOTTLE 07139-09

## (undated) DEVICE — Device

## (undated) RX ORDER — TROPICAMIDE 10 MG/ML
SOLUTION/ DROPS OPHTHALMIC
Status: DISPENSED
Start: 2019-11-27

## (undated) RX ORDER — PHENYLEPHRINE HYDROCHLORIDE 25 MG/ML
SOLUTION/ DROPS OPHTHALMIC
Status: DISPENSED
Start: 2019-11-27

## (undated) RX ORDER — CYCLOPENTOLATE HYDROCHLORIDE 10 MG/ML
SOLUTION/ DROPS OPHTHALMIC
Status: DISPENSED
Start: 2019-12-16

## (undated) RX ORDER — TROPICAMIDE 10 MG/ML
SOLUTION/ DROPS OPHTHALMIC
Status: DISPENSED
Start: 2019-12-16

## (undated) RX ORDER — CYCLOPENTOLATE HYDROCHLORIDE 10 MG/ML
SOLUTION/ DROPS OPHTHALMIC
Status: DISPENSED
Start: 2019-11-27

## (undated) RX ORDER — PHENYLEPHRINE HYDROCHLORIDE 25 MG/ML
SOLUTION/ DROPS OPHTHALMIC
Status: DISPENSED
Start: 2019-12-16